# Patient Record
Sex: FEMALE | Race: WHITE
[De-identification: names, ages, dates, MRNs, and addresses within clinical notes are randomized per-mention and may not be internally consistent; named-entity substitution may affect disease eponyms.]

---

## 2018-02-28 ENCOUNTER — HOSPITAL ENCOUNTER (EMERGENCY)
Dept: HOSPITAL 57 - BURERS | Age: 73
Discharge: HOME | End: 2018-02-28
Payer: MEDICARE

## 2018-02-28 DIAGNOSIS — N39.0: ICD-10-CM

## 2018-02-28 DIAGNOSIS — M54.16: Primary | ICD-10-CM

## 2018-02-28 LAB
BACTERIA UR QL AUTO: (no result) HPF
PROT UR STRIP.AUTO-MCNC: 30 MG/DL
RBC UR QL AUTO: (no result) HPF (ref 0–3)
SP GR UR STRIP: 1.01 (ref 1–1.03)
UNIDENT CRYS #/AREA URNS HPF: (no result) HPF
WBC UR QL AUTO: (no result) HPF (ref 0–3)

## 2018-02-28 PROCEDURE — 74176 CT ABD & PELVIS W/O CONTRAST: CPT

## 2018-02-28 PROCEDURE — 81015 MICROSCOPIC EXAM OF URINE: CPT

## 2018-02-28 PROCEDURE — 81003 URINALYSIS AUTO W/O SCOPE: CPT

## 2018-02-28 PROCEDURE — 87086 URINE CULTURE/COLONY COUNT: CPT

## 2018-02-28 NOTE — CT
CT ABDOMEN AND PELVIS WITHOUT CONTRAST

2/28/18

 

Spiral CT of the abdomen and pelvis was done without oral or IV contrast for evaluation of right lowe
r buttocks pain and back pain that radiated into the right lower extremity. Comparison is made with a
 7/23/07 study. 

 

The lung bases are clear. The liver, spleen, pancreas, adrenal glands and abdominal aorta showed no a
cute findings within the limitations of a noncontrast study. 

 

Bilateral nonobstructing renal calculi are present. No renal masses were appreciated. There are some 
rounded structures in the left renal pelvis that are probably parapelvic cysts. No ureteral stones we
re seen. While there are many calcifications near the UVJ of the bladder on each side, none appear to
 be within the ureter, and no ureter is dilated. 

 

The bowel is unremarkable showing no dilation, wall thickening or inflammatory change around it. No f
ree air or free fluid was present. 

 

CT of the pelvis shows no pelvic masses, fluid collections, or inflammatory changes. There is promine
nt concentric bulging of the L5-S1 disc which may even impinge upon the thecal sac a bit and perhaps 
even touch the right S1 root. Additionally, there is moderate central canal stenosis at L4-L5 due to 
facet and ligamentous hypertrophy. There is a mild anterior compression of L2.

 

IMPRESSION:  

1.      Bilateral nonobstructing renal calculi.

2.      Prominent concentrically bulging discs at L5-S1 which might be problematic. Moderate spinal s
tenosis at L4-L5. 

 

POS: HOME

## 2018-10-09 ENCOUNTER — HOSPITAL ENCOUNTER (INPATIENT)
Dept: HOSPITAL 92 - ERS | Age: 73
LOS: 6 days | Discharge: ANOTHER HOSPITAL | DRG: 853 | End: 2018-10-15
Attending: INTERNAL MEDICINE | Admitting: INTERNAL MEDICINE
Payer: MEDICARE

## 2018-10-09 VITALS — BODY MASS INDEX: 23.4 KG/M2

## 2018-10-09 DIAGNOSIS — K64.5: ICD-10-CM

## 2018-10-09 DIAGNOSIS — E87.2: ICD-10-CM

## 2018-10-09 DIAGNOSIS — Z88.5: ICD-10-CM

## 2018-10-09 DIAGNOSIS — N13.6: ICD-10-CM

## 2018-10-09 DIAGNOSIS — E83.42: ICD-10-CM

## 2018-10-09 DIAGNOSIS — G93.41: ICD-10-CM

## 2018-10-09 DIAGNOSIS — E83.39: ICD-10-CM

## 2018-10-09 DIAGNOSIS — D65: ICD-10-CM

## 2018-10-09 DIAGNOSIS — D50.9: ICD-10-CM

## 2018-10-09 DIAGNOSIS — G89.29: ICD-10-CM

## 2018-10-09 DIAGNOSIS — J96.01: ICD-10-CM

## 2018-10-09 DIAGNOSIS — R65.21: ICD-10-CM

## 2018-10-09 DIAGNOSIS — E87.6: ICD-10-CM

## 2018-10-09 DIAGNOSIS — K21.9: ICD-10-CM

## 2018-10-09 DIAGNOSIS — N95.2: ICD-10-CM

## 2018-10-09 DIAGNOSIS — R31.9: ICD-10-CM

## 2018-10-09 DIAGNOSIS — A41.51: Primary | ICD-10-CM

## 2018-10-09 DIAGNOSIS — N18.2: ICD-10-CM

## 2018-10-09 DIAGNOSIS — N17.9: ICD-10-CM

## 2018-10-09 DIAGNOSIS — K62.89: ICD-10-CM

## 2018-10-09 DIAGNOSIS — D49.4: ICD-10-CM

## 2018-10-09 DIAGNOSIS — M54.9: ICD-10-CM

## 2018-10-09 DIAGNOSIS — D69.59: ICD-10-CM

## 2018-10-09 DIAGNOSIS — J96.00: ICD-10-CM

## 2018-10-09 LAB
ANALYZER IN CARDIO: (no result)
ANALYZER IN CARDIO: (no result)
ANION GAP SERPL CALC-SCNC: 13 MMOL/L (ref 10–20)
APTT PPP: 51 SEC (ref 22.9–36.1)
APTT PPP: 62.6 SEC (ref 22.9–36.1)
BASE EXCESS STD BLDA CALC-SCNC: -10.7 MEQ/L
BASE EXCESS STD BLDA CALC-SCNC: -11.3 MEQ/L
BUN SERPL-MCNC: 22 MG/DL (ref 9.8–20.1)
CA-I BLDA-SCNC: 1.04 MMOL/L (ref 1.12–1.3)
CA-I BLDA-SCNC: 1.04 MMOL/L (ref 1.12–1.3)
CALCIUM SERPL-MCNC: 7.2 MG/DL (ref 7.8–10.44)
CHLORIDE SERPL-SCNC: 112 MMOL/L (ref 98–107)
CO2 SERPL-SCNC: 18 MMOL/L (ref 23–31)
CREAT CL PREDICTED SERPL C-G-VRATE: 0 ML/MIN (ref 70–130)
CRP SERPL-MCNC: 12.11 MG/DL
D DIMER PPP FEU-MCNC: (no result) *MCG/ML (ref 0.27–0.43)
DOHLE BOD BLD QL SMEAR: SLIGHT
FIBRINOGEN PPP-MCNC: 148 MG/DL (ref 253–463)
FSP-QUALITATIVE: (no result)
FSP-SEMIQUANTITATIVE: (no result) MCG/ML (ref ?–5)
GLUCOSE SERPL-MCNC: 62 MG/DL (ref 83–110)
HCO3 BLDA-SCNC: 13.2 MEQ/L (ref 22–28)
HCO3 BLDA-SCNC: 14.4 MEQ/L (ref 22–28)
HCT VFR BLDA CALC: 25 % (ref 36–47)
HCT VFR BLDA CALC: 28 % (ref 36–47)
HGB BLD-MCNC: 10 G/DL (ref 12–16)
HGB BLDA-MCNC: 8.4 G/DL (ref 12–16)
HGB BLDA-MCNC: 9.5 G/DL (ref 12–16)
INR PPP: 1.9
INR PPP: 2.2
MAGNESIUM SERPL-MCNC: 1.2 MG/DL (ref 1.6–2.6)
MCH RBC QN AUTO: 30.4 PG (ref 27–31)
MCV RBC AUTO: 98.3 FL (ref 78–98)
MDIFF COMPLETE?: YES
O2 A-A PPRESDIFF RESPIRATORY: 218.43 MM[HG] (ref 0–20)
OVALOCYTES BLD QL SMEAR: (no result) (100X)
PCO2 BLDA: 23.5 MMHG (ref 35–45)
PCO2 BLDA: 31.6 MMHG (ref 35–45)
PH BLDA: 7.28 [PH] (ref 7.35–7.45)
PH BLDA: 7.37 [PH] (ref 7.35–7.45)
PLATELET # BLD AUTO: 44 THOU/UL (ref 130–400)
PLATELET # BLD AUTO: 44 THOU/UL (ref 130–400)
PLATELET BLD QL SMEAR: (no result)
PO2 BLDA: 100.7 MMHG (ref 70–?)
PO2 BLDA: 108.7 MMHG (ref 70–?)
POTASSIUM BLD-SCNC: 2.65 MMOL/L (ref 3.7–5.3)
POTASSIUM BLD-SCNC: 3.1 MMOL/L (ref 3.7–5.3)
POTASSIUM SERPL-SCNC: 3.5 MMOL/L (ref 3.5–5.1)
PROTHROMBIN TIME: 21.6 SEC (ref 12–14.7)
PROTHROMBIN TIME: 24.5 SEC (ref 12–14.7)
RBC # BLD AUTO: 3.28 MILL/UL (ref 4.2–5.4)
SODIUM SERPL-SCNC: 139 MMOL/L (ref 136–145)
SPECIMEN DRAWN FROM PATIENT: (no result)
SPECIMEN DRAWN FROM PATIENT: (no result)
WBC # BLD AUTO: 9.1 THOU/UL (ref 4.8–10.8)

## 2018-10-09 PROCEDURE — 87070 CULTURE OTHR SPECIMN AEROBIC: CPT

## 2018-10-09 PROCEDURE — 85049 AUTOMATED PLATELET COUNT: CPT

## 2018-10-09 PROCEDURE — 94003 VENT MGMT INPAT SUBQ DAY: CPT

## 2018-10-09 PROCEDURE — 36415 COLL VENOUS BLD VENIPUNCTURE: CPT

## 2018-10-09 PROCEDURE — 88121 CYTP URINE 3-5 PROBES CMPTR: CPT

## 2018-10-09 PROCEDURE — 86140 C-REACTIVE PROTEIN: CPT

## 2018-10-09 PROCEDURE — 84100 ASSAY OF PHOSPHORUS: CPT

## 2018-10-09 PROCEDURE — 83690 ASSAY OF LIPASE: CPT

## 2018-10-09 PROCEDURE — 0BH17EZ INSERTION OF ENDOTRACHEAL AIRWAY INTO TRACHEA, VIA NATURAL OR ARTIFICIAL OPENING: ICD-10-PCS | Performed by: INTERNAL MEDICINE

## 2018-10-09 PROCEDURE — 86900 BLOOD TYPING SEROLOGIC ABO: CPT

## 2018-10-09 PROCEDURE — 82805 BLOOD GASES W/O2 SATURATION: CPT

## 2018-10-09 PROCEDURE — 5A1945Z RESPIRATORY VENTILATION, 24-96 CONSECUTIVE HOURS: ICD-10-PCS | Performed by: INTERNAL MEDICINE

## 2018-10-09 PROCEDURE — S0028 INJECTION, FAMOTIDINE, 20 MG: HCPCS

## 2018-10-09 PROCEDURE — 85384 FIBRINOGEN ACTIVITY: CPT

## 2018-10-09 PROCEDURE — 87186 SC STD MICRODIL/AGAR DIL: CPT

## 2018-10-09 PROCEDURE — C1758 CATHETER, URETERAL: HCPCS

## 2018-10-09 PROCEDURE — 85027 COMPLETE CBC AUTOMATED: CPT

## 2018-10-09 PROCEDURE — 83735 ASSAY OF MAGNESIUM: CPT

## 2018-10-09 PROCEDURE — 82533 TOTAL CORTISOL: CPT

## 2018-10-09 PROCEDURE — 85379 FIBRIN DEGRADATION QUANT: CPT

## 2018-10-09 PROCEDURE — 87077 CULTURE AEROBIC IDENTIFY: CPT

## 2018-10-09 PROCEDURE — P9045 ALBUMIN (HUMAN), 5%, 250 ML: HCPCS

## 2018-10-09 PROCEDURE — 80053 COMPREHEN METABOLIC PANEL: CPT

## 2018-10-09 PROCEDURE — 86850 RBC ANTIBODY SCREEN: CPT

## 2018-10-09 PROCEDURE — 36416 COLLJ CAPILLARY BLOOD SPEC: CPT

## 2018-10-09 PROCEDURE — 0T768DZ DILATION OF RIGHT URETER WITH INTRALUMINAL DEVICE, VIA NATURAL OR ARTIFICIAL OPENING ENDOSCOPIC: ICD-10-PCS | Performed by: UROLOGY

## 2018-10-09 PROCEDURE — 85007 BL SMEAR W/DIFF WBC COUNT: CPT

## 2018-10-09 PROCEDURE — 86901 BLOOD TYPING SEROLOGIC RH(D): CPT

## 2018-10-09 PROCEDURE — 83605 ASSAY OF LACTIC ACID: CPT

## 2018-10-09 PROCEDURE — BT1F1ZZ FLUOROSCOPY OF LEFT KIDNEY, URETER AND BLADDER USING LOW OSMOLAR CONTRAST: ICD-10-PCS | Performed by: UROLOGY

## 2018-10-09 PROCEDURE — 85362 FIBRIN DEGRADATION PRODUCTS: CPT

## 2018-10-09 PROCEDURE — P9035 PLATELET PHERES LEUKOREDUCED: HCPCS

## 2018-10-09 PROCEDURE — 96374 THER/PROPH/DIAG INJ IV PUSH: CPT

## 2018-10-09 PROCEDURE — 85610 PROTHROMBIN TIME: CPT

## 2018-10-09 PROCEDURE — 94660 CPAP INITIATION&MGMT: CPT

## 2018-10-09 PROCEDURE — 0T778DZ DILATION OF LEFT URETER WITH INTRALUMINAL DEVICE, VIA NATURAL OR ARTIFICIAL OPENING ENDOSCOPIC: ICD-10-PCS | Performed by: UROLOGY

## 2018-10-09 PROCEDURE — 87205 SMEAR GRAM STAIN: CPT

## 2018-10-09 PROCEDURE — 85730 THROMBOPLASTIN TIME PARTIAL: CPT

## 2018-10-09 PROCEDURE — 74420 UROGRAPHY RTRGR +-KUB: CPT

## 2018-10-09 PROCEDURE — 71045 X-RAY EXAM CHEST 1 VIEW: CPT

## 2018-10-09 PROCEDURE — 3E043XZ INTRODUCTION OF VASOPRESSOR INTO CENTRAL VEIN, PERCUTANEOUS APPROACH: ICD-10-PCS | Performed by: INTERNAL MEDICINE

## 2018-10-09 PROCEDURE — C1769 GUIDE WIRE: HCPCS

## 2018-10-09 PROCEDURE — 36430 TRANSFUSION BLD/BLD COMPNT: CPT

## 2018-10-09 PROCEDURE — 85300 ANTITHROMBIN III ACTIVITY: CPT

## 2018-10-09 PROCEDURE — 94002 VENT MGMT INPAT INIT DAY: CPT

## 2018-10-09 PROCEDURE — 02H633Z INSERTION OF INFUSION DEVICE INTO RIGHT ATRIUM, PERCUTANEOUS APPROACH: ICD-10-PCS | Performed by: INTERNAL MEDICINE

## 2018-10-09 PROCEDURE — P9047 ALBUMIN (HUMAN), 25%, 50ML: HCPCS

## 2018-10-09 PROCEDURE — 94640 AIRWAY INHALATION TREATMENT: CPT

## 2018-10-09 RX ADMIN — DEXTROSE MONOHYDRATE SCH: 25 INJECTION, SOLUTION INTRAVENOUS at 18:22

## 2018-10-09 RX ADMIN — Medication PRN MLS: at 18:49

## 2018-10-09 RX ADMIN — DEXTROSE MONOHYDRATE SCH GM: 25 INJECTION, SOLUTION INTRAVENOUS at 18:38

## 2018-10-09 RX ADMIN — FAMOTIDINE SCH MG: 10 INJECTION, SOLUTION INTRAVENOUS at 20:05

## 2018-10-09 NOTE — HP
DATE OF ADMISSION:  10/09/2018.

 

PRIMARY CARE PHYSICIAN:  Emperatriz Valerio D.O.

 

CHIEF COMPLAINT:  Generalized weakness.

 

HISTORY OF PRESENT ILLNESS:  The patient initially presented to [facility] Emergency Room and was transferred to this facility.

 

HISTORY OF PRESENT ILLNESS:  The patient is a 73-year-old female who presented 
to the emergency room with above complaints.  At this time, patient is 
intubated on mechanical ventilation.  History obtained from the chart.  No 
family at the bedside.  The patient presented to [place] [facility] with 
generalized weakness along with dizziness.  She also had abdominal discomfort 
yesterday with one episode of vomiting.  She has been nauseous over the last 24 
hours.  No diarrhea reported.

 

In the emergency room, initial vital signs showed temperature 99.8, 
respirations 15, pulse rate of 124 with a blood pressure of 77/46 with O2 
saturation of 96% on room air.  She underwent a CT scan stone protocol that 
showed 5 mm stone at UVJ with significant hydronephrosis.  She received Toradol
, Levaquin, ceftriaxone, Zofran and IV fluids and was transferred to this 
facility for hospital admission.

 

The patient underwent ureteral stent placement on an emergent basis.  She is 
currently intubated, on mechanical ventilation.

 

PAST MEDICAL HISTORY:  GERD, chronic back pain, and vertigo.

 

PAST SURGICAL HISTORY:

1.  Cholecystectomy.

2.  Hysterectomy.

3.  Removal of kidney stone.

4.  Bilateral cataract surgery.

 

ALLERGIES:  The patient is allergic to DARVOCET.

 

CURRENT HOME MEDICATIONS:  Per ER record, Norco, iron, Zofran, ibuprofen, 
vitamin D, and Pepcid.

 

SOCIAL HISTORY:  Patient currently lives at home with her family.  No smoking, 
alcohol, or drug use reported.  She makes her own decision with the help of her 
family.  She is FULL CODE.

 

FAMILY HISTORY:  Negative for heart disease.

 

REVIEW OF SYSTEMS:  Cannot be obtained from the patient due to current 
cognitive status.

 

PHYSICAL EXAMINATION:

VITAL SIGNS:  As discussed above.

GENERAL:  A 73-year-old female, intubated and sedated on mechanical ventilation.

HEENT:  Head atraumatic, normocephalic.  Sclerae are anicteric.  Dry mucous 
membrane, no oral lesion.

NECK:  Supple, no JVD, no carotid bruit.

LUNGS:  Showed diminished air entry at bilateral bases.  No wheezing or 
rhonchi.  Lungs were symmetrical.

HEART:  S1, S2 present, tachycardic.  No rubs or gallops appreciated.

ABDOMEN:  Soft.  Bowel sounds present.  No rebound or guarding.

EXTREMITIES:  No edema or calf tenderness.

NEUROLOGY AND PSYCHIATRY:  Could not be done due to current cognitive status.

SKIN:  Warm and dry.

LYMPH NODES:  No palpable lymph nodes in the neck.

PERIPHERAL VASCULAR:  Radial pulses palpable bilaterally.

 

LABORATORY AND X-RAY FINDINGS:  CBC showed WBC of 11 with hemoglobin 10.9, 
platelets of 49.  INR of 2.2 with PT 24.5, PTT 62.6.  ABGs have been ordered 
and pending at this time.  Chemistries showed sodium 142, potassium 3.4, 
chloride 107, bicarbonate of 20, BUN 24, creatinine 1.66, total bilirubin 4.3 
with AST of 332, ALT 80, alkaline phosphatase 353, albumin 2.7.  CRP 12.1.  
Cortisol 29.9, magnesium 1.2, phosphorus 1.9.  Urinalysis showed greater than 
50 wbc's with 3+ bacteria.

 

Chest x-ray by my review in the emergency room was negative for infiltrate.  CT 
scan of the abdomen and pelvis, renal stone protocol showed 5 mm distal left 
ureteral calculus causing severe left hydronephrosis.  There are bilateral 
nonobstructing calculi in the kidneys.  Telemetry monitoring by my review 
showed sinus tachycardia.

 

IMPRESSION:

1.  Severe sepsis with acute organ dysfunction/septic shock secondary to 
complicated urinary tract infection.  The patient underwent ureteral stent 
placement on an emergent basis.

2.  Acute hypoxic respiratory failure on mechanical ventilation.  The patient 
was unable to maintain her O2 saturation during the above procedure and was 
subsequently intubated.

3.  Acute kidney injury on chronic kidney disease stage 2 secondary to #1.

4.  Metabolic acidosis/lactic acidosis.  Lactic acid in the emergency room was 
8.1, repeat was 6.1.

5.  Thrombocytopenia with coagulopathy, probably secondary to sepsis.  
Platelets were normal at 192 last month.

6.  Electrolyte abnormality.  The patient has hypokalemia, hypophosphatemia, 
and hypomagnesemia.

7.  Abnormal liver function tests of unclear etiology.  Probably related to 
sepsis.  Patient has history of cholecystectomy.

8. Suspected Hemorrhoid vs Anal mass (per Urology) - further w/u once patient 
stabilizes.

9.  Deep venous thrombosis prophylaxis with sequential compression devices.  No 
anticoagulants per Urology.

 

PLAN:  The patient will be monitored in the Intensive Care Unit.  We will 
consult Critical Care.  We will continue IV hydration.  We will monitor labs, 
lactic acid and coagulation profile on a daily basis.  Pressors as needed.  
Ventilation sedation protocol.  Daily chest x-rays.  We will get ABG on daily 
basis. 

 

Plan of care was discussed with the patient.  We will discuss the plan of care 
with the family.

 

YEHUDA

## 2018-10-09 NOTE — RAD
AP CHEST:

 

Date:  10/09/18 

 

HISTORY:  

Assess central line placement. 

 

FINDINGS:

An ET tube is in place with tip above brandon. A central line is in place and tip overlies the right a
trium. 

 

The lungs appear well aerated and clear of infiltrate. Vascular markings upper normal. 

 

IMPRESSION: 

No acute lung process. ET tube and central line appear adequately positioned. 

 

 

POS: [facility]

## 2018-10-09 NOTE — HP
DATE OF CONSULTATION:  10/09/2018.

 

ER consultation regarding urosepsis and obstructing ureteral calculi.

 

HISTORY OF PRESENT ILLNESS:  Ms. Molina is a 73-year-old female, transferred 
from [facility] for higher level of care.  She presented with 
uroseptic picture due to obstructing left UVJ stone.  The patient is somewhat 
lethargic, is a poor historian.  Therefore, much of the history is obtained per 
chart.  No family at bedside.  She does relate prior history of kidney stone 
surgery removed, she states that this was years ago somewhere in [facility].  She was found to be hypotensive, tachycardic.  CT demonstrated left 
distal ureteral stone with severe hydronephrosis with bilateral renal calculi.  
There is a right renal pelvic stone as well.  Currently, she is resting 
comfortably; however, appears lethargic.

 

PAST MEDICAL HISTORY:  History of kidney stones, iron deficiency anemia, vertigo
, acid reflux, history of thrombocytopenia.

 

PAST SURGICAL HISTORY:  Cholecystectomy, hysterectomy, prior history of kidney 
stone surgery, nature unknown, bilateral cataract surgery.

 

PSYCHIATRIC:  Negative.

 

SOCIAL HISTORY:  Negative.  Lives with her  who is not at the bedside 
yet.

 

PHYSICAL EXAMINATION:

VITAL SIGNS:  On arrival.  She is 75/50, currently 90/47, heart rate 117.  Pain 
is currently rated 0, 95% on room air.

 

HOME MEDICATIONS:  Include Norco 10/325, iron, Zofran, ibuprofen 800 mg t.i.d., 
vitamin D3, Pepcid.  She has been provided Rocephin and Levaquin in [facility].

 

REVIEW OF SYSTEMS:  Ten-point review of systems as above.

 

PHYSICAL EXAMINATION:

GENERAL:  She is somewhat lethargic, somewhat difficult to arouse; however, 
arousable.  She is a poor historian.

HEENT:  Grossly unremarkable.

HEART:  Tachycardic.

LUNGS:  Decreased inspiratory effort.

ABDOMEN:  No rigidity, no rebound.  Right upper quadrant incision consistent 
with open cholecystectomy.  Midline infraumbilical incision, likely consistent 
with hysterectomy.

GENITOURINARY:  Deferred.

EXTREMITIES:  No cyanosis, clubbing, or edema.

 

PERTINENT LABS:  Sodium 142, potassium 3.4, creatinine 1.6 from baseline of 
0.7.  Lactic acid is 8.1.  White blood cell count is 11, hemoglobin 10, 
platelets 49, her baseline.  Previously, platelet was 192, however, he is known 
to have low platelet previously of 84.  Urinalysis is dark greater than 300 
protein, 11-20 rbc's, greater than 50 wbc's, 4-6 epithelial, 3+ bacteria.

 

CT of the abdomen and pelvis without contrast which I reviewed myself.

1.  Severe left hydronephrosis due to 5 mm left distal ureteral stone at the 
level of ureterovesical junction.  There may be 1 or 2 small calculi just 
proximal to this.  Bilateral nonobstructing renal calculi, left lower pole 
linear calcific density x2.  On the right, there is a punctate mid pole 
calcification, there is a right renal pelvic proximal ureteral stone measuring 
approximately 6-7 mm.

2.  Possible several calcifications along the course of the right ureter; 
however, appears not to be within it.  There are multiple pelvic phleboliths.

 

IMPRESSION/PLAN:  Ms. Molina is a 73-year-old female who is currently 
presenting with uroseptic picture.  She does have definite left hydronephrosis 
with UVJ stone.  The right UPJ proximal ureteral stone is concerning for 
progression and obstruction.  Therefore, I would advise bilateral stent 
placement.  I will proceed with treatment of stone at a later date when she has 
recovered in a few weeks.  She was admitted to Medicine for sepsis.  She is 
n.p.o. for cystoscopy, bilateral stent.  Meropenem on call to the OR.

 

[facility]

## 2018-10-09 NOTE — OP
DATE OF PROCEDURE:  10/09/2018

 

PREOPERATIVE DIAGNOSES:

1.  A 73-year-old female presents with urosepsis, elevated lactic acid.

2.  Acute kidney injury.

3.  Left distal ureteral stone x2 measuring approximately 5-6 mm each, with 
left hydronephrosis.  Left linear calcific density x2 in the lower pole.

4.  Right 6-mm UPJ proximal ureteral stone, right punctate renal calculi.

 

POSTOPERATIVE DIAGNOSES:

1.  A 73-year-old female presents with urosepsis, elevated lactic acid.

2.  Acute kidney injury.

3.  Left distal ureteral stone x2 measuring approximately 5-6 mm each, with 
left hydronephrosis.  Left linear calcific density x2 in the lower pole.

4.  Right 6-mm UPJ proximal ureteral stone, right punctate renal calculi.

 

PROCEDURES PERFORMED:  Cystoscopy, bilateral 6 x 26 double-J ureteral stent 
placement, left retrograde pyelogram.

 

SPECIMENS:

1.  Left ureteral catheterization, urine culture specimen.

2.  Urine FISH cytology barbotaged.

 

DRAINS:  Bilateral 6 x 26 stent 20-French 3-way Jenkins catheter with CBI port 
plugged to gravity.

 

INTRAOPERATIVE FINDINGS:

1.  Urine pungent, consistent with urinary tract infection.

2.  Severe atrophic vaginitis.

3.  Incidental right trigonal bladder mass approximately 1.5 cm with no active 
bleeding ,suspicious for occult transitional cell carcinoma.

4.  Large perianal mass, large thrombosed hemorrhoids, cannot rule out occult 
malignancy.  Recommend elective  General Surgery evaluation.

 

INDICATIONS FOR THE PROCEDURE AND HISTORY:  Ms. Molina is a 73-year-old 
female who presented to [facility].  She was found to have hypotension
, tachycardia.  CT demonstrating left hydronephrosis due to distal ureteral 
calculi, right renal calculi as above.  There is no obvious bladder mass on CT.
  She presented with lactic acid of 8.1 with acute kidney injury of creatinine 
of 1.6.  Her platelet count is low at 49,000.  She has a history of low 
platelet count and previous of 84.  Given the above findings, she presents with 
septic picture advised regarding emergent stent placement.  Risks and 
complications of the procedure was reviewed with them in detail including, but 
not limited to, bleeding, pain, infection, injury to adjacent organs, 
urosepsis.  I did inform regarding right stent concomitantly.  Although the 
right renal pelvic stone is obstructing, concern regarding imminent migration 
and obstruction, worsening her septic picture.  Therefore, family desired to 
proceed with bilateral stent.  Elective treatment of stone at a later date was 
discussed with patient and family in detail.

 

DESCRIPTION OF THE PROCEDURE:  After an informed consent was signed, the 
patient was taken to the operating room, placed in a dorsal lithotomy position 
with the genital area prepped and draped in the usual surgical sterile fashion. 
Upon placing the patient in a dorsal lithotomy position, she has a large 
thrombosed mass of the anal region.  There is a polyp associated.  They 
appeared to be not significantly hard and indurated; however, it is very large.
  She does have significant atrophic vaginitis.  We prepped and draped during 
provided meropenem on call.  A 21-French cystoscope was utilized.  Upon 
entering the bladder, the sponge and smelling urine consistent with cystitis.  
There was mild amount of debris.  At this time, we surveyed the bladder.  After 
we irrigated the bladder, we had better visualization.  She has an incidental 
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 
1.5 cm.  There is no active bleeding appreciated.  I did not want to treat the 
bladder tumor at this time as she presents with hypotension and severe sepsis.  
We passed a left open-ended catheter into the left collecting system.  I was 
able to pass the open-ended catheter to the level of the proximal ureter.  I 
passed a 0.35 sensor wire; however, the curl of the wire was suboptimal 
regarding obvious placement into the collecting system.  Therefore, I performed 
a gentle retrograde to confirm proper placement.  It was indeed in the proper 
placement and a 6 x 26 double-J ureteral stent was passed into the left kidney.
  The right 6 x 26 stent was able to be passed over a guidewire without 
perforating a retrograde.  A 6 x 26 was placed into the right kidney.  At this 
time, we placed a 20-French three-way Jenkins catheter as she does have 
significant amount of hematuria with the stents.  She is not bleeding from a 
bladder tumor as it was not endoscopically bleeding.  I believe that hematuria 
component is from the ureteral stent placement.  She has severe low platelet 
count at 44.  Therefore, I did place a 20-French three-way Jenkins catheter with 
CBI port plugged.  This irrigates.  She has hematuria which will need to be 
observed.  She was transported to the recovery room in a guarded condition.  
Due to respiratory failure, she is intubated.  I did inform the Hospitalist 
regarding holding anticoagulation due to thrombocytopenia and hematuria.  
General Surgery elective evaluation is advised regarding anal mass.

 

YEHUDA

## 2018-10-09 NOTE — RAD
RETROGRADE PYELOGRAM:

10/9/18

 

Two fluoroscopic images presented from OR during retrograde procedure. 

 

INDICATIONS:

Bilateral stent placement and intraoperative imaging.

 

FINDINGS/IMPRESSION:  

First image shows a left ureteral stent in place with opacification of the left upper collecting stru
ctures. 

 

The second image shows bilateral double pigtail ureteral stents. 

 

POS: [facility]

## 2018-10-10 LAB
ALBUMIN SERPL BCG-MCNC: 2.3 G/DL (ref 3.4–4.8)
ALP SERPL-CCNC: 201 U/L (ref 40–150)
ALT SERPL W P-5'-P-CCNC: 41 U/L (ref 8–55)
ANALYZER IN CARDIO: (no result)
ANION GAP SERPL CALC-SCNC: 10 MMOL/L (ref 10–20)
ANION GAP SERPL CALC-SCNC: 10 MMOL/L (ref 10–20)
APTT PPP: 61.1 SEC (ref 22.9–36.1)
AST SERPL-CCNC: 111 U/L (ref 5–34)
BASE EXCESS STD BLDA CALC-SCNC: -8.9 MEQ/L
BILIRUB SERPL-MCNC: 2.4 MG/DL (ref 0.2–1.2)
BUN SERPL-MCNC: 19 MG/DL (ref 9.8–20.1)
BUN SERPL-MCNC: 21 MG/DL (ref 9.8–20.1)
CA-I BLDA-SCNC: 1.13 MMOL/L (ref 1.12–1.3)
CALCIUM SERPL-MCNC: 7.4 MG/DL (ref 7.8–10.44)
CALCIUM SERPL-MCNC: 7.4 MG/DL (ref 7.8–10.44)
CHLORIDE SERPL-SCNC: 114 MMOL/L (ref 98–107)
CHLORIDE SERPL-SCNC: 116 MMOL/L (ref 98–107)
CO2 SERPL-SCNC: 16 MMOL/L (ref 23–31)
CO2 SERPL-SCNC: 16 MMOL/L (ref 23–31)
CREAT CL PREDICTED SERPL C-G-VRATE: 0 ML/MIN (ref 70–130)
CREAT CL PREDICTED SERPL C-G-VRATE: 41 ML/MIN (ref 70–130)
GLOBULIN SER CALC-MCNC: 1.6 G/DL (ref 2.4–3.5)
GLUCOSE SERPL-MCNC: 150 MG/DL (ref 83–110)
GLUCOSE SERPL-MCNC: 167 MG/DL (ref 83–110)
HCO3 BLDA-SCNC: 13.9 MEQ/L (ref 22–28)
HCT VFR BLDA CALC: 29 % (ref 36–47)
HGB BLD-MCNC: 9.3 G/DL (ref 12–16)
HGB BLDA-MCNC: 10 G/DL (ref 12–16)
INR PPP: 1.9
LIPASE SERPL-CCNC: (no result) U/L (ref 8–78)
MAGNESIUM SERPL-MCNC: 2.4 MG/DL (ref 1.6–2.6)
MCH RBC QN AUTO: 30.9 PG (ref 27–31)
MCV RBC AUTO: 94.9 FL (ref 78–98)
MDIFF COMPLETE?: YES
O2 A-A PPRESDIFF RESPIRATORY: 124.62 MM[HG] (ref 0–20)
PCO2 BLDA: 21.5 MMHG (ref 35–45)
PH BLDA: 7.43 [PH] (ref 7.35–7.45)
PLATELET # BLD AUTO: 31 THOU/UL (ref 130–400)
PLATELET BLD QL SMEAR: (no result)
PO2 BLDA: 133.7 MMHG (ref 70–?)
POTASSIUM BLD-SCNC: 2.87 MMOL/L (ref 3.7–5.3)
POTASSIUM SERPL-SCNC: 2.9 MMOL/L (ref 3.5–5.1)
POTASSIUM SERPL-SCNC: 3.1 MMOL/L (ref 3.5–5.1)
POTASSIUM SERPL-SCNC: 3.2 MMOL/L (ref 3.5–5.1)
PROTHROMBIN TIME: 21.6 SEC (ref 12–14.7)
RBC # BLD AUTO: 3 MILL/UL (ref 4.2–5.4)
SODIUM SERPL-SCNC: 137 MMOL/L (ref 136–145)
SODIUM SERPL-SCNC: 139 MMOL/L (ref 136–145)
SPECIMEN DRAWN FROM PATIENT: (no result)
WBC # BLD AUTO: 12.9 THOU/UL (ref 4.8–10.8)

## 2018-10-10 RX ADMIN — FAMOTIDINE SCH MG: 10 INJECTION, SOLUTION INTRAVENOUS at 21:35

## 2018-10-10 RX ADMIN — MEROPENEM AND SODIUM CHLORIDE SCH MLS: 1 INJECTION, SOLUTION INTRAVENOUS at 01:24

## 2018-10-10 RX ADMIN — Medication PRN MLS: at 05:51

## 2018-10-10 RX ADMIN — POTASSIUM CHLORIDE PRN MLS: 29.8 INJECTION, SOLUTION INTRAVENOUS at 17:34

## 2018-10-10 RX ADMIN — Medication PRN MLS: at 17:33

## 2018-10-10 RX ADMIN — MEROPENEM AND SODIUM CHLORIDE SCH MLS: 1 INJECTION, SOLUTION INTRAVENOUS at 15:09

## 2018-10-10 NOTE — PDOC.PN
- Subjective


Encounter Start Date: 10/10/18


Encounter Start Time: 13:30


-: non-verbal





Patient seen and examined for Septic shock. On Vent/pressors. No overnight 

events





- Objective


Resuscitation Status: 


 











Resuscitation Status           FULL:Full Resuscitation














MAR Reviewed: Yes


Vital Signs & Weight: 


 Vital Signs (12 hours)











  Temp Pulse Resp BP


 


 10/10/18 22:12   122 H   90/54 L


 


 10/10/18 18:28   135 H   84/53 L


 


 10/10/18 18:00    21 H 


 


 10/10/18 17:03   131 H   99/58 L


 


 10/10/18 17:00  100.8 F H   


 


 10/10/18 16:00    18 


 


 10/10/18 14:36   130 H   106/65


 


 10/10/18 14:00    16 


 


 10/10/18 12:00    18 








 Weight











Admit Weight                   134 lb 8 oz


 


Weight                         134 lb 8 oz











 Most Recent Monitor Data











Heart Rate from ECG            121


 


NIBP                           95/56


 


NIBP BP-Mean                   66


 


Respiration from ECG           18


 


SpO2                           100














I&O: 


 











 10/09/18 10/10/18 10/11/18





 06:59 06:59 06:59


 


Intake Total  3290.6 3483.6


 


Output Total  1655 2410


 


Balance  1635.6 1073.6











Result Diagrams: 


 10/11/18 05:45





 10/11/18 05:45


Additional Labs: 


 Accuchecks











  10/10/18 10/10/18 10/10/18





  18:18 12:26 04:40


 


POC Glucose  111 H  144 H  154 H














  10/10/18





  00:44


 


POC Glucose  152 H











EKG Reviewed by me: Yes (Tele ST)





Phys Exam





- Physical Examination


On Vent


Neck: no JVD


Respiratory: no wheezing, no rales, no rhonchi


dec AE at bases


Cardiovascular: RRR, no rub


no heaves/pulsations


Gastrointestinal: soft, positive bowel sounds


no guarding/rigidity


Musculoskeletal: no edema


Neuro/Psych - Cannot obtain due to current cognition





Dx/Plan





- Plan


DVT proph w/SCDs





IMPRESSION:


1.  Severe sepsis with acute organ dysfunction/septic shock secondary to 

complicated urinary tract infection.  s/p ureteral stent placement


2.  Acute hypoxic respiratory failure on mechanical ventilation. 


3.  Acute kidney injury on chronic kidney disease stage 2 secondary to #1.


4.  Metabolic acidosis/lactic acidosis.  Lactic acid in the emergency room was 

8.1, repeat was 6.1.


5.  Thrombocytopenia with coagulopathy, probably secondary to sepsis.  

Platelets were normal at 192 last month.


6.  Electrolyte abnormality.  The patient has hypokalemia, hypophosphatemia, 

and hypomagnesemia.


7.  Abnormal liver function tests.


8.  External Hemorrhoid/New Bladder cancer


9.  Deep venous thrombosis prophylaxis with sequential compression devices.  


 


PLAN:


Cont Atbx/pressors/Vent support


Replace electrolytes


AM labs


Cont IVF


Cont other meds as below














Review of Systems





- Review of Systems


Other: 





Cannot obtain due to current cogntion





- Medications/Allergies


Allergies/Adverse Reactions: 


 Allergies











Allergy/AdvReac Type Severity Reaction Status Date / Time


 


No Known Drug Allergies Allergy   Verified 08/27/18 15:57











Medications: 


 Current Medications





Acetaminophen (Tylenol Elixir)  650 mg PO Q6H PRN


   PRN Reason: Fever > 101 or Mild Pain


Acetaminophen (Tylenol)  650 mg VA Q6H PRN


   PRN Reason: Fever > 101 or Mild Pain


Albuterol/Ipratropium (Duoneb)  3 ml NEB [id number] PRN


   PRN Reason: SOB &/or Wheezing


Bisacodyl (Dulcolax)  10 mg VA DAILYPRN PRN


   PRN Reason: Constipation


Famotidine (Pepcid)  20 mg SLOW IVP Q24HR ALEJANDRO


   Last Admin: 10/10/18 21:35 Dose:  20 mg


Norepinephrine Bitartrate (Levophed)  250 mls @ 0 mls/hr IVPB PRN PRN; Protocol


   PRN Reason: To maintain MAP > 65


   Last Admin: 10/10/18 17:33 Dose:  250 mls


Fentanyl Citrate 2,000 mcg/ (Sodium Chloride)  100 mls @ 0 mls/hr IV INF ALEJANDRO; 

Protocol


   Stop: 11/08/18 16:45


Fentanyl Citrate (Fentanyl Bolus)  250 mls @ 0 mls/hr IVPB PRN PRN


   PRN Reason: Breakthrough pain/agitation


   Stop: 11/08/18 16:45


Meropenem 1 gm/ Device  50 mls @ 100 mls/hr IVPB 0200,1400 ALEJANDRO


   Last Admin: 10/10/18 15:09 Dose:  50 mls


Dextrose/Water (Dextrose 10% In Water)  1,000 mls @ 125 mls/hr IV .Q8H ALEJANDRO


   Last Admin: 10/10/18 17:35 Dose:  1,000 mls


Potassium Chloride 40 meq/ (Sodium Chloride)  270 mls @ 135 mls/hr IVPB ASDIR 

PRN


   PRN Reason: FOR SERUM K+ 2.5 - 3.5


Potassium Chloride 40 meq/ (Device)  100 mls @ 50 mls/hr IVPB ASDIR PRN


   PRN Reason: FOR SERUM K+ 2.5 - 3.5


   Last Admin: 10/10/18 17:34 Dose:  100 mls


Magnesium Sulfate 1 gm/ Sodium (Chloride)  102 mls @ 102 mls/hr IV PRN PRN


   PRN Reason: MAG LEVEL 1.4 - 2.0


Magnesium Sulfate 2 gm/ Device  100 mls @ 100 mls/hr IVPB ASDIR PRN


   PRN Reason: MAGNESIUM < 1.4


Potassium Phosphate 9 mmol/ (Sodium Chloride)  103 mls @ 25.75 mls/hr IVPB 

ASDIR PRN


   PRN Reason: Phosphate 1.0-1.8


Potassium Phosphate 12 mmol/ (Sodium Chloride)  254 mls @ 63.5 mls/hr IV ASDIR 

PRN


   PRN Reason: Serum phosphate 0.5-0.9


Potassium Phosphate 15 mmol/ (Sodium Chloride)  255 mls @ 63.75 mls/hr IV ASDIR 

PRN


   PRN Reason: Serum Phos < 0.5


Lorazepam (Ativan)  2 mg SLOW IVP Q1H PRN


   PRN Reason: Breakthrough agitation


   Stop: 11/08/18 16:45


   Last Admin: 10/09/18 22:40 Dose:  2 mg


Magnesium Oxide (Magnesium Oxide)  400 mg PO BIDPRN PRN


   PRN Reason: FOR SERUM MAG 1.4 - 2.0


Magnesium Oxide (Magnesium Oxide)  800 mg PO PRN PRN


   PRN Reason: FOR SERUM MAG < 1.4


Mineral Oil/White Petrolatum (Eucerin Cream)  0 gm TOP BIDPRN PRN


   PRN Reason: Dry Skin


Miscellaneous Medication (Pharmacy To Dose)  1 each IVPB PRN PRN


   PRN Reason: Pharmacy to dose


Miscellaneous Medication (Phos-Nak)  1 pkt PO TIDPRN PRN


   PRN Reason: FOR PHOS LEVEL 1.0 - 1.8


Miscellaneous Medication (Phos-Nak)  2 pkt PO TIDPRN PRN


   PRN Reason: FOR PHOS LEVEL 0.5 - 1.0


Morphine Sulfate (Morphine)  2 mg SLOW IVP Q1H PRN


   PRN Reason: BREAKTHROUGH PAIN/AGITATION


   Stop: 11/08/18 16:45


   Last Admin: 10/10/18 21:35 Dose:  2 mg


Discontinue Previous Narcotic Pain Medications And Benzodiazepines  1 each FS 

.ONE ALEJANDRO


   Stop: 11/08/18 16:45


Ondansetron HCl (Zofran)  4 mg IVP Q6H PRN


   PRN Reason: Nausea/Vomiting


   Last Admin: 10/09/18 22:54 Dose:  4 mg


Ondansetron HCl (Zofran)  4 mg IVP Q4H PRN


   PRN Reason: Nausea/Vomiting


Potassium Chloride (K-Dur)  40 meq PO ASDIR PRN


   PRN Reason: FOR SERUM K+  2.5 - 3.5


Potassium Chloride (Klor-Con)  40 meq PER TUBE ASDIR PRN


   PRN Reason: FOR SERUM K+ 2.5-3.5


   Last Admin: 10/10/18 07:41 Dose:  40 meq


Propofol (Diprivan)  1,000 mg IV INF PRN; Protocol


   PRN Reason: TO ACHIEVE GOAL RASS


   Stop: 11/08/18 16:45


   Last Admin: 10/09/18 22:40 Dose:  1,000 mg


Propofol (Diprivan Bolus)  20 mg IV Q5MIN PRN


   PRN Reason: BREAKTHROUGH AGITATION


   Stop: 11/08/18 16:45


Sodium Chloride (Flush - Normal Saline)  10 ml IVF PRN PRN


   PRN Reason: Saline Flush

## 2018-10-10 NOTE — PRG
DATE OF SERVICE:  10/10/2018

 

SUBJECTIVE:  The patient intubated on pressors, blood pressure 84/56.

 

OBJECTIVE:

VITAL SIGNS:  T-max 100.3, 84/56, 67.  I's and O's 3290 in and 1550 out.  Urine 
output concentrated yellow.

ABDOMEN:  Soft.  No rigidity, no rebound.

GENITOURINARY:  Jenkins catheter in place.  No significant hematuria of concern.

 

PERTINENT LABORATORY DATA:  White count 12, hemoglobin 9.3, platelet count of 31
,000 with 38 bands.  INR is 2.2, PTT of 62.  Admitting creatinine 1.6, today it 
is 1.2, potassium was 2.9.  Liver function panel elevated.  Lactic acid on 
admission 8, repeat 4.9 this morning.

 

IMPRESSION AND PLAN:  A 73-year-old female admitted for;

1.  Gram-negative le and blood culture urosepsis.  Postoperative day #1 status 
post cystoscopy, bilateral stent.

2.  CT demonstrating left hydronephrosis due to 5-6 mm ureterovesical junction 
stone, possibly x2, nonobstructing left renal lithiasis, curvilinear.

3.  Right 6-mm ureteropelvic junction, proximal ureteral stone, right punctate 
renal calculi.  Continue indwelling Jenkins catheter, broad spectrum antibiotic, 
patient remains in critical status.

4.  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 
1.5 cm.  I will perform transurethral resection of bladder tumor later date 
with ureteroscopy, laser lithotripsy.  Continue meropenem and vancomycin.  
Await final sensitivity and ID.

5.  Large perianal mass, likely large thrombosed hemorrhoid, recommend elective 
General Surgery evaluation, cannot rule out occult malignancy, although 
suspicion low.

 

MTDD

## 2018-10-10 NOTE — CON
DATE OF CONSULTATION:  10/10/2018

 

HISTORY OF PRESENT ILLNESS:  Naomy Molina is a 73-year-old female who was admitted for urinary se
psis, undergoing 10/09/2018 operative procedure by Dr. Castro for left distal ureteral stone and
 a right ureteral stone and double-J ureteral stent placement with a left retrograde pyelogram.  The 
patient is septic and has respiratory failure on the ventilator.  She is being weaned now.  She has n
ever had a colonoscopy.  There is no family history of colon cancer.  I have been asked to see her re
garding a suspicious perianal finding.   is at the bedside and although the patient cannot giv
e a history the  does tell me. 

 

ALLERGIES:  None.

 

TOBACCO:  None.

 

ALCOHOL:  None.

 

PAST SURGICAL HISTORY:  Total abdominal hysterectomy, bilateral salpingo-oophorectomy, laparoscopic c
holecystectomy, left ureteral nephrectomy, left flank incision from in the 1970s.

 

PHYSICAL EXAMINATION:

VITAL SIGNS:  Height 5 foot 6, 134 pounds, 21 BMI, 91/50, 131.

LUNGS:  Clear to auscultation.

CARDIAC:  Regular rate and rhythm without murmur or gallop.

ABDOMEN:  Soft, nontender.  Eyes open to voice.

EXTREMITIES:  Unremarkable.  

:  Left flank and incision consistent with prior ureteral stone extraction in the 1970s.  Jenkins cat
heter in place.  

RECTAL:  Perianal area is normal except for large hemorrhoids.  Rectal exam performed was normal with
out masses.  She has stool in the vault.  She has a mild edematous hemorrhoids and hemorrhoidal tags.
  She has some firm hemorrhoid areas that are probably represent past thrombosis.  There are no suspi
cious masses.

 

ASSESSMENT AND PLAN:  Hemorrhoidal disease, but nothing acute that is in need of acute treatment.  Co
ntinue treatment for current medical problems and please call if needed.  The patient is 73-years-old
 and never has had a colonoscopy, might be considered for elective colonoscopy in the future outpatie
nt.

## 2018-10-10 NOTE — PRG
DATE OF SERVICE:  10/10/2018

 

Ms. Molina awakens, she nods to commands.

 

Her sedation was held this morning.  She is very slow to awaken completely.  
She has to be stimulated to awaken her.  She still has septic hemodynamics with 
a resting sinus tachycardia and pressor requirement.  Her Levophed requirements 
have decreased today.

 

Her intake and outputs positive 1635.

 

OBJECTIVE:

LUNGS:  Clear anteriorly.

HEART:  Regular rhythm.

ABDOMEN:  Soft.

EXTREMITIES:  Without asymmetry.  She moves all 4 extremities.

 

LABORATORY DATA:  White count 12.9, hemoglobin 9.3, platelets 31,000.  She 
still has 38% bands on her peripheral smear.  DIC panel was suggestive of some 
degree of DIC.  Blood gas 7.43, CO2 of 21, PO2 133.

 

Sodium 137; potassium 2.9, which has been replaced; chloride 114; bicarbonate 16
; BUN 21; creatinine 1.21.

 

IMPRESSION:

1.  Clinical sepsis secondary to urinary tract infection.  Gram negatives are 
growing from her urine.  Blood cultures from [facility] are positive for E. coli.
  Sensitivities are pending.

2.  Hypotension secondary to urosepsis.

3.  Respiratory failure secondary to urosepsis and metabolic acidosis that is 
improving.

 

Given that E. coli has been isolated, there is no reason to continue the 
vancomycin.

 

We will continue with ventilatory support.  We probably will wean and extubate 
her until first thing in the morning.

 

Critical care time was 30 minutes.

 

[facility]

## 2018-10-10 NOTE — RAD
SEMI UPRIGHT CHEST ONE VIEW:

 

History: 

73-year-old female with history of respiratory insufficiency. 

 

Comparison: 

10-9-18

 

FINDINGS: 

There is considerable rotation to the left. Left support tubes in place. Bilateral vascular congestio
n with some blunting in the costophrenic angles bilaterally, evidence for some pleural effusion which
 may be slightly worse than on the prior study. 

 

IMPRESSION: 

Considerable rotation to the left. Progressive vascular congestion and probable pleural effusions. Co
ntinues short term follow up for clearing or stability. 

 

POS: [facility]

## 2018-10-11 LAB
ALBUMIN SERPL BCG-MCNC: 1.9 G/DL (ref 3.4–4.8)
ALP SERPL-CCNC: 198 U/L (ref 40–150)
ALT SERPL W P-5'-P-CCNC: 29 U/L (ref 8–55)
ANALYZER IN CARDIO: (no result)
ANION GAP SERPL CALC-SCNC: 8 MMOL/L (ref 10–20)
APTT PPP: 60.6 SEC (ref 22.9–36.1)
AST SERPL-CCNC: 44 U/L (ref 5–34)
BASE EXCESS STD BLDA CALC-SCNC: -9.5 MEQ/L
BILIRUB SERPL-MCNC: 1.6 MG/DL (ref 0.2–1.2)
BUN SERPL-MCNC: 19 MG/DL (ref 9.8–20.1)
BURR CELLS BLD QL SMEAR: (no result) (100X)
CA-I BLDA-SCNC: 1.17 MMOL/L (ref 1.12–1.3)
CALCIUM SERPL-MCNC: 7.4 MG/DL (ref 7.8–10.44)
CHLORIDE SERPL-SCNC: 115 MMOL/L (ref 98–107)
CO2 SERPL-SCNC: 17 MMOL/L (ref 23–31)
CREAT CL PREDICTED SERPL C-G-VRATE: 46 ML/MIN (ref 70–130)
DOHLE BOD BLD QL SMEAR: SLIGHT
GLOBULIN SER CALC-MCNC: 1.5 G/DL (ref 2.4–3.5)
GLUCOSE SERPL-MCNC: 146 MG/DL (ref 83–110)
HCO3 BLDA-SCNC: 15.1 MEQ/L (ref 22–28)
HCT VFR BLDA CALC: 26 % (ref 36–47)
HGB BLD-MCNC: 8.7 G/DL (ref 12–16)
HGB BLDA-MCNC: 9 G/DL (ref 12–16)
INR PPP: 1.4
MAGNESIUM SERPL-MCNC: 1.9 MG/DL (ref 1.6–2.6)
MCH RBC QN AUTO: 29.6 PG (ref 27–31)
MCV RBC AUTO: 94.5 FL (ref 78–98)
MDIFF COMPLETE?: YES
O2 A-A PPRESDIFF RESPIRATORY: 120.58 MM[HG] (ref 0–20)
PCO2 BLDA: 28.1 MMHG (ref 35–45)
PH BLDA: 7.35 [PH] (ref 7.35–7.45)
PLATELET # BLD AUTO: 17 THOU/UL (ref 130–400)
PLATELET BLD QL SMEAR: (no result)
PO2 BLDA: 129.5 MMHG (ref 70–?)
POTASSIUM BLD-SCNC: 3.39 MMOL/L (ref 3.7–5.3)
POTASSIUM SERPL-SCNC: 3.4 MMOL/L (ref 3.5–5.1)
PROTHROMBIN TIME: 17.3 SEC (ref 12–14.7)
RBC # BLD AUTO: 2.95 MILL/UL (ref 4.2–5.4)
SODIUM SERPL-SCNC: 137 MMOL/L (ref 136–145)
SPECIMEN DRAWN FROM PATIENT: (no result)
WBC # BLD AUTO: 19.7 THOU/UL (ref 4.8–10.8)

## 2018-10-11 RX ADMIN — ALBUMIN HUMAN SCH GM: 250 SOLUTION INTRAVENOUS at 17:16

## 2018-10-11 RX ADMIN — Medication PRN MLS: at 03:30

## 2018-10-11 RX ADMIN — DEXTROSE AND SODIUM CHLORIDE SCH MLS: 5; 200 INJECTION, SOLUTION INTRAVENOUS at 13:15

## 2018-10-11 RX ADMIN — MEROPENEM AND SODIUM CHLORIDE SCH MLS: 1 INJECTION, SOLUTION INTRAVENOUS at 15:30

## 2018-10-11 RX ADMIN — FAMOTIDINE SCH MG: 10 INJECTION, SOLUTION INTRAVENOUS at 21:57

## 2018-10-11 RX ADMIN — POTASSIUM CHLORIDE PRN MLS: 29.8 INJECTION, SOLUTION INTRAVENOUS at 07:41

## 2018-10-11 RX ADMIN — MEROPENEM AND SODIUM CHLORIDE SCH MLS: 1 INJECTION, SOLUTION INTRAVENOUS at 02:30

## 2018-10-11 NOTE — PRG
DATE OF SERVICE:  10/11/2018

 

SUBJECTIVE:  The patient remains intubated on pressors, remains in critical condition.

 

PHYSICAL EXAMINATION: 

VITAL SIGNS:  T-max 100.8, currently 99.7, heart rate 116, blood pressure 103/59.  I's and O's 5724 i
n, urine output 3825.  Gastric drainage 250.

ABDOMEN:  Soft.  No rigidity, no rebound.

GENITOURINARY:  Jenkins catheter draining yellow urine despite severe thrombocytopenia.

 

LABORATORY DATA:  White blood cell count 19, hemoglobin 8.7, admitting, hemoglobin is 10, platelet on
 admission 44, currently 17, 46 bands.  INR is 1.4, PTT of 60.6.  Renal function has improved from 1.
2 to 1.0.  Elevated liver function tests, lactic acid, decreased from 8-3.4.  Blood culture, urine cu
lture demonstrating E. coli, sensitivity pending.

 

She is currently on meropenem and vancomycin.

 

IMPRESSION AND PLAN:  

1.  A 73-year-old female with E. coli urosepsis.

2.  History of left distal ureteral stone x2 with hydronephrosis.

3.  Right renal pelvic proximal ureteral stone, postoperative day #2 status post cystoscopy, bilatera
l stent.

4.  Cystoscopy demonstrating incidental right trigonal bladder mass, 1.5 cm.  

 

FISH cytology ordered, results pending.  

 

RECOMMENDATIONS:  Despite her coagulopathy from severe sepsis she has not had significant hematuria. 
 Plan per nursing staff Dr. Dale is considering extubation.  If she continues to have high grade fev
er and persistent leukocytosis, would advise regarding restaging CT.  Condition remains guarded.

## 2018-10-11 NOTE — RAD
PORTABLE CHEST:

 

Date: 10/11/18 

 

HISTORY:  

Respiratory distress. 

 

COMPARISON:  

Prior day's study. 

 

FINDINGS:

Endotracheal tube and NG tubes are in satisfactory position. Right subclavian line is unchanged in po
sition. Lungs remain clear of any infiltrates. Left ureteral stent is noted. 

 

IMPRESSION: 

Stable exam. 

 

 

POS: [facility]

## 2018-10-11 NOTE — PDOC.PN
- Subjective


Encounter Start Date: 10/11/18


Encounter Start Time: 09:45


-: non-verbal





Patient seen and examined for Septic shock. On [facility] Vent/pressors. No overnight 

events





- Objective


Resuscitation Status: 


 











Resuscitation Status           FULL:Full Resuscitation














MAR Reviewed: Yes


Vital Signs & Weight: 


 Vital Signs (12 hours)











  Temp Pulse Resp BP Pulse Ox


 


 10/11/18 16:00  98.0 F    


 


 10/11/18 11:45      100


 


 10/11/18 11:00  99.1 F    


 


 10/11/18 10:54   119 H   103/58 L 


 


 10/11/18 10:00    15  


 


 10/11/18 08:00    12   100


 


 10/11/18 07:00  99.0 F    


 


 10/11/18 06:43   116 H   103/59 L 


 


 10/11/18 06:00    13  








 Weight











Admit Weight                   134 lb 8 oz


 


Weight                         145 lb 11.609 oz











 Most Recent Monitor Data











Heart Rate from ECG            113


 


NIBP                           89/63


 


NIBP BP-Mean                   75


 


Respiration from ECG           29


 


SpO2                           100














I&O: 


 











 10/10/18 10/11/18 10/12/18





 06:59 06:59 06:59


 


Intake Total 3290.6 5724.6 617


 


Output Total 1655 3875 740


 


Balance 1635.6 1849.6 -123











Result Diagrams: 


 10/11/18 05:45





 10/11/18 05:45


Additional Labs: 


 Accuchecks











  10/11/18 10/11/18 10/11/18





  16:06 14:30 13:41


 


POC Glucose  86  126 H  60 L














  10/11/18 10/11/18 10/11/18





  13:15 05:48 00:32


 


POC Glucose  63 L  133 H  110














  10/10/18 10/09/18





  18:18 17:45


 


POC Glucose  111 H  Less than  35 L*











EKG Reviewed by me: Yes ([facility])





Phys Exam





- Physical Examination


On Vent


Respiratory: no wheezing, no rhonchi


Cardiovascular: RRR, no rub


Gastrointestinal: soft, positive bowel sounds


Musculoskeletal: no edema





Dx/Plan





- Plan


DVT proph w/SCDs





IMPRESSION:


1.  Severe sepsis with acute organ dysfunction/septic shock secondary to 

complicated urinary tract infection.  s/p ureteral stent placement


2.  Acute hypoxic respiratory failure on mechanical ventilation. 


3.  Acute kidney injury on chronic kidney disease stage 2 secondary to #1.


4.  Metabolic acidosis/lactic acidosis.  


5.  Thrombocytopenia with coagulopathy, probably secondary to sepsis.  


6.  Electrolyte abnormality - hypokalemia, hypophosphatemia, and hypomagnesemia.


7.  Abnormal liver function tests.


8.  External Hemorrhoid/New Bladder cancer


9.  Deep venous thrombosis prophylaxis with sequential compression devices.  


 


PLAN:


Cont Atbx/pressors/Vent support


Cont IVF


Cont other meds as below


AM labs


 Laboratory Tests











  10/11/18 10/11/18 10/11/18





  05:45 05:45 05:45


 


Band Neuts % (Manual)   46 H 


 


INR  1.4  


 


APTT  60.6 H  


 


Potassium    3.4 L


 


Lactic Acid   


 


Total Bilirubin    1.6 H


 


AST    44 H


 


Alkaline Phosphatase    198 H














  10/11/18





  13:13


 


Band Neuts % (Manual) 


 


INR 


 


APTT 


 


Potassium 


 


Lactic Acid  2.6 H


 


Total Bilirubin 


 


AST 


 


Alkaline Phosphatase 

















Review of Systems





- Review of Systems


Other: 





Cannot obtain due to current mentation





- Medications/Allergies


Allergies/Adverse Reactions: 


 Allergies











Allergy/AdvReac Type Severity Reaction Status Date / Time


 


No Known Drug Allergies Allergy   Verified 08/27/18 15:57











Medications: 


 Current Medications





Acetaminophen (Tylenol Elixir)  650 mg PO Q6H PRN


   PRN Reason: Fever > 101 or Mild Pain


Acetaminophen (Tylenol)  650 mg SD Q6H PRN


   PRN Reason: Fever > 101 or Mild Pain


   Last Admin: 10/11/18 15:48 Dose:  650 mg


Albumin Human (Albumin 25%)  25 gm IVPB Q6HR ALEJANDRO


   Stop: 10/13/18 18:01


Albuterol/Ipratropium (Duoneb)  3 ml NEB [id number] PRN


   PRN Reason: SOB &/or Wheezing


Bisacodyl (Dulcolax)  10 mg SD DAILYPRN PRN


   PRN Reason: Constipation


Famotidine (Pepcid)  20 mg SLOW IVP Q24HR ALEJANDRO


   Last Admin: 10/10/18 21:35 Dose:  20 mg


Norepinephrine Bitartrate (Levophed)  250 mls @ 0 mls/hr IVPB PRN PRN; Protocol


   PRN Reason: To maintain MAP > 65


   Last Admin: 10/11/18 03:30 Dose:  250 mls


Meropenem 1 gm/ Device  50 mls @ 100 mls/hr IVPB 0200,1400 ALEJANDRO


   Last Admin: 10/11/18 15:30 Dose:  50 mls


Potassium Chloride 40 meq/ (Sodium Chloride)  270 mls @ 135 mls/hr IVPB ASDIR 

PRN


   PRN Reason: FOR SERUM K+ 2.5 - 3.5


Potassium Chloride 40 meq/ (Device)  100 mls @ 50 mls/hr IVPB ASDIR PRN


   PRN Reason: FOR SERUM K+ 2.5 - 3.5


   Last Admin: 10/11/18 07:41 Dose:  100 mls


Magnesium Sulfate 1 gm/ Sodium (Chloride)  102 mls @ 102 mls/hr IV PRN PRN


   PRN Reason: MAG LEVEL 1.4 - 2.0


Magnesium Sulfate 2 gm/ Device  100 mls @ 100 mls/hr IVPB ASDIR PRN


   PRN Reason: MAGNESIUM < 1.4


Potassium Phosphate 9 mmol/ (Sodium Chloride)  103 mls @ 25.75 mls/hr IVPB 

ASDIR PRN


   PRN Reason: Phosphate 1.0-1.8


Potassium Phosphate 12 mmol/ (Sodium Chloride)  254 mls @ 63.5 mls/hr IV ASDIR 

PRN


   PRN Reason: Serum phosphate 0.5-0.9


Potassium Phosphate 15 mmol/ (Sodium Chloride)  255 mls @ 63.75 mls/hr IV ASDIR 

PRN


   PRN Reason: Serum Phos < 0.5


Dextrose/Sodium Chloride (D5 1/4 Ns)  1,000 mls @ 75 mls/hr IV .[id number] ALEJANDRO


   Last Admin: 10/11/18 13:15 Dose:  1,000 mls


Magnesium Oxide (Magnesium Oxide)  400 mg PO BIDPRN PRN


   PRN Reason: FOR SERUM MAG 1.4 - 2.0


Magnesium Oxide (Magnesium Oxide)  800 mg PO PRN PRN


   PRN Reason: FOR SERUM MAG < 1.4


Mineral Oil/White Petrolatum (Eucerin Cream)  0 gm TOP BIDPRN PRN


   PRN Reason: Dry Skin


Miscellaneous Medication (Pharmacy To Dose)  1 each IVPB PRN PRN


   PRN Reason: Pharmacy to dose


Miscellaneous Medication (Phos-Nak)  1 pkt PO TIDPRN PRN


   PRN Reason: FOR PHOS LEVEL 1.0 - 1.8


Miscellaneous Medication (Phos-Nak)  2 pkt PO TIDPRN PRN


   PRN Reason: FOR PHOS LEVEL 0.5 - 1.0


Discontinue Previous Narcotic Pain Medications And Benzodiazepines  1 each FS 

.ONE [facility]


   Stop: 11/08/18 16:45


Ondansetron HCl (Zofran)  4 mg IVP Q6H PRN


   PRN Reason: Nausea/Vomiting


   Last Admin: 10/09/18 22:54 Dose:  4 mg


Ondansetron HCl (Zofran)  4 mg IVP Q4H PRN


   PRN Reason: Nausea/Vomiting


Potassium Chloride (K-Dur)  40 meq PO ASDIR PRN


   PRN Reason: FOR SERUM K+  2.5 - 3.5


Potassium Chloride (Klor-Con)  40 meq PER TUBE ASDIR PRN


   PRN Reason: FOR SERUM K+ 2.5-3.5


   Last Admin: 10/10/18 07:41 Dose:  40 meq


Sodium Chloride (Flush - Normal Saline)  10 ml IVF PRN PRN


   PRN Reason: Saline Flush

## 2018-10-11 NOTE — PRG
DATE OF SERVICE:  10/11/2018

 

SUBJECTIVE:  Ms. Molina was awakened this morning.  She received a small dose of morphine early and
 it took for an hour to wake up and therefore I felt comfortable extubating her.  She is still on a l
ow dose of Levophed.

 

PHYSICAL EXAMINATION:

VITAL SIGNS:  She is afebrile, heart rates 115 at 1450, she is 98/43, respiratory rate is in the 20s.


LUNGS:  Clear.

HEART:  Regular rhythm.

ABDOMEN:  Soft.

EXTREMITIES:  Without asymmetry.

 

LABORATORY DATA:  White count 19.7, hemoglobin 8.7, platelets 17,000.  Sodium 137, potassium 3.4, chl
oride 115, bicarbonate 17, BUN 19 and creatinine 1.08.

 

IMPRESSION:

1.  Urinary tract sepsis with E. coli growing from urine and blood.  This is a pansensitive organism 
fortunately.

2.  Persistent hypotension.  There is no lab evidence of adrenal insufficiency.

3.  Disseminated intravascular coagulation negative.

4.  Weakness and deconditioning.

5.  Sensitivity to opiates, all opiates will be discontinued.

6.  Respiratory failure secondary to her need for an invasive procedure in the face of a metabolic ac
idosis.  Her acid base disorder is now converted to more of a hyperchloremic acidosis.  I felt she wa
s a candidate for extubation.  This has been done successfully.  She will remain in the Critical Care
 Unit.  Opiate should be avoided even in tiny doses using Tylenol and anti-inflammatories would be be
st for pain control if she has any pain.

 

Critical care time was 30 minutes.

## 2018-10-12 LAB
ALBUMIN SERPL BCG-MCNC: 3 G/DL (ref 3.4–4.8)
ALP SERPL-CCNC: 193 U/L (ref 40–150)
ALT SERPL W P-5'-P-CCNC: 26 U/L (ref 8–55)
ANION GAP SERPL CALC-SCNC: 12 MMOL/L (ref 10–20)
AST SERPL-CCNC: 50 U/L (ref 5–34)
BILIRUB SERPL-MCNC: 3.5 MG/DL (ref 0.2–1.2)
BUN SERPL-MCNC: 17 MG/DL (ref 9.8–20.1)
CALCIUM SERPL-MCNC: 8.5 MG/DL (ref 7.8–10.44)
CHLORIDE SERPL-SCNC: 116 MMOL/L (ref 98–107)
CO2 SERPL-SCNC: 16 MMOL/L (ref 23–31)
CREAT CL PREDICTED SERPL C-G-VRATE: 63 ML/MIN (ref 70–130)
GLOBULIN SER CALC-MCNC: 1.3 G/DL (ref 2.4–3.5)
GLUCOSE SERPL-MCNC: 78 MG/DL (ref 83–110)
HGB BLD-MCNC: 7.6 G/DL (ref 12–16)
INR PPP: 1.4
MAGNESIUM SERPL-MCNC: 1.8 MG/DL (ref 1.6–2.6)
MCH RBC QN AUTO: 30.4 PG (ref 27–31)
MCV RBC AUTO: 95.1 FL (ref 78–98)
MDIFF COMPLETE?: YES
PLATELET # BLD AUTO: 7 THOU/UL (ref 130–400)
PLATELET BLD QL SMEAR: (no result)
POTASSIUM SERPL-SCNC: 4.3 MMOL/L (ref 3.5–5.1)
PROTHROMBIN TIME: 16.9 SEC (ref 12–14.7)
RBC # BLD AUTO: 2.51 MILL/UL (ref 4.2–5.4)
SODIUM SERPL-SCNC: 140 MMOL/L (ref 136–145)
WBC # BLD AUTO: 12.2 THOU/UL (ref 4.8–10.8)

## 2018-10-12 PROCEDURE — 30233R1 TRANSFUSION OF NONAUTOLOGOUS PLATELETS INTO PERIPHERAL VEIN, PERCUTANEOUS APPROACH: ICD-10-PCS | Performed by: INTERNAL MEDICINE

## 2018-10-12 RX ADMIN — ALBUMIN HUMAN SCH: 250 SOLUTION INTRAVENOUS at 19:51

## 2018-10-12 RX ADMIN — ALBUMIN HUMAN SCH GM: 250 SOLUTION INTRAVENOUS at 12:13

## 2018-10-12 RX ADMIN — MEROPENEM AND SODIUM CHLORIDE SCH MLS: 1 INJECTION, SOLUTION INTRAVENOUS at 15:09

## 2018-10-12 RX ADMIN — FAMOTIDINE SCH MG: 10 INJECTION, SOLUTION INTRAVENOUS at 08:53

## 2018-10-12 RX ADMIN — DEXTROSE AND SODIUM CHLORIDE SCH MLS: 5; 200 INJECTION, SOLUTION INTRAVENOUS at 13:24

## 2018-10-12 RX ADMIN — MEROPENEM AND SODIUM CHLORIDE SCH: 1 INJECTION, SOLUTION INTRAVENOUS at 19:51

## 2018-10-12 RX ADMIN — ALBUMIN HUMAN SCH GM: 250 SOLUTION INTRAVENOUS at 17:40

## 2018-10-12 RX ADMIN — ALBUMIN HUMAN SCH GM: 250 SOLUTION INTRAVENOUS at 00:29

## 2018-10-12 RX ADMIN — DEXTROSE AND SODIUM CHLORIDE SCH: 5; 200 INJECTION, SOLUTION INTRAVENOUS at 19:51

## 2018-10-12 NOTE — PRG
DATE OF SERVICE:  10/12/2018

 

SUBJECTIVE:  The patient has been extubated, remains confused, arousable.

 

PHYSICAL EXAMINATION:

VITAL SIGNS:  She has been afebrile for 24 hours, currently is 98.3.  

 

No recent CBC.  Creatinine yesterday was 1.0.

 

ABDOMEN:  Soft.  No rigidity, no rebound.

GENITOURINARY:  Jenkins catheter demonstrating yellow urine.

 

I's and O's, urine output 1 liter over 24 hours.  Blood culture, urine culture 
demonstrating E. coli, pansensitive.   currently on meropenem and vancomycin.

 

IMPRESSION AND PLAN:

1.  Ms. Molina is a 73-year-old female remains in critical status due to 
Escherichia coli urosepsis/DIC

2.  History of left distal ureteral stone x2 with hydronephrosis.

3.  Right renal pelvic proximal ureteral stone.

4.  Status post cystoscopy, bilateral stent.

5.  Cystoscopy demonstrating incidental trigonal bladder mass, 1.5 cm.  FISH 
cytology pending.

 

RECOMMENDATIONS:  The patient remains in critical status and will be observed 
most likely over the weekend in the Intensive Care Unit. with DIC 
thrombocytopenia, coagulopathy however no active bleeding is appreciated.  Plan 
elective bilateral ureteroscopy, TURBT after the patient recovers from current 
Escherichia coli urosepsis.  Will likely require Infectious Disease consult for 
IV PICC line due to presenting urosepsis with positive blood culture.  If 
recurrence of fever or persistent leukocytosis, restaging CT would be 
warranted.  Dr. Hargrove is  covering me this weekend.

 

YEHUDA

## 2018-10-12 NOTE — PRG
DATE OF SERVICE:  10/12/2018

 

Naomy Molina is still encephalopathic.  She is very weak.  She will not follow commands to cough.  S
he has some rattling in her throat from secretions.  She is not in respiratory distress.  

 

PHYSICAL EXAMINATION: 

LUNGS:  Her lungs are clear other than the transmitted noise from her throat.

HEART:  Regular rhythm.

ABDOMEN:  Soft and nontender.

EXTREMITIES:  Without asymmetry or edema.  She moves all 4 extremities. 

 

IMPRESSION:

1.  Encephalopathy secondary to sepsis.  

2.  Sepsis secondary to pyelonephritis secondary to an obstructing nephrolith.

3.  Status post stenting.

4.  DIC.

5.  Deconditioning and weakness.

 

PLAN:  Remain in the Critical Care Unit.  She is not to move out.  She is at high risk for needing re
intubation the next few days unless her encephalopathy improves and she is able to cough.  She does n
ot need BiPAP or intubation at this time.

## 2018-10-12 NOTE — PDOC.PN
- Subjective


Encounter Start Date: 10/12/18


Encounter Start Time: 11:45





Patient seen and examined for Sepsis. Extubated. Somnolent. No overnight events





- Objective


Resuscitation Status: 


 











Resuscitation Status           FULL:Full Resuscitation














MAR Reviewed: Yes


Vital Signs & Weight: 


 Vital Signs (12 hours)











  Temp Pulse Pulse Ox


 


 10/12/18 20:00  98.3 F   98


 


 10/12/18 19:06   104 H 


 


 10/12/18 16:00  98.4 F   99


 


 10/12/18 15:20   110 H 


 


 10/12/18 12:30   108 H 


 


 10/12/18 12:00    89 L








 Weight











Admit Weight                   134 lb 8 oz


 


Weight                         145 lb 11.609 oz











 Most Recent Monitor Data











Heart Rate from ECG            107


 


NIBP                           111/77


 


NIBP BP-Mean                   97


 


Respiration from ECG           37


 


SpO2                           98














I&O: 


 











 10/11/18 10/12/18 10/13/18





 06:59 06:59 06:59


 


Intake Total 5724.6 1373 1335


 


Output Total 3875 1385 1840


 


Balance 1849.6 -12 -505











Result Diagrams: 


 10/13/18 04:10





 10/13/18 04:10


Additional Labs: 


 Accuchecks











  10/12/18 10/12/18





  17:37 12:44


 


POC Glucose  102  85











EKG Reviewed by me: Yes (Tele ST)





Phys Exam





- Physical Examination


Constitutional: NAD


follow commands intermittenly


Respiratory: no wheezing, no rales


Coarse BS B/L


Cardiovascular: RRR, no rub


Gastrointestinal: soft, positive bowel sounds


Musculoskeletal: no edema


Neurological: moves all 4 limbs





Dx/Plan





- Plan


DVT proph w/SCDs





IMPRESSION:


1.  Severe sepsis with acute organ dysfunction/septic shock secondary to 

complicated urinary tract infection.  s/p ureteral stent placement


2.  Acute hypoxic respiratory failure - extubated 10/12


3.  Acute kidney injury on chronic kidney disease stage 2 secondary to #1.


4.  Metabolic acidosis/lactic acidosis.  


5.  Thrombocytopenia with coagulopathy, probably secondary to sepsis.  


6.  Electrolyte abnormality - hypokalemia, hypophosphatemia, and hypomagnesemia.


7.  Abnormal liver function tests.


8.  External Hemorrhoid/New Bladder cancer


9.  Deep venous thrombosis prophylaxis with sequential compression devices.  


 


PLAN:


Transfuse Platelets


Cont Atbx/pressors


Cont current IVF


Cont other meds as below


AM labs








Review of Systems





- Review of Systems


Other: 





Cannot obtain due to current mentation.





- Medications/Allergies


Allergies/Adverse Reactions: 


 Allergies











Allergy/AdvReac Type Severity Reaction Status Date / Time


 


No Known Drug Allergies Allergy   Verified 08/27/18 15:57











Medications: 


 Current Medications





Acetaminophen (Tylenol Elixir)  650 mg PO Q6H PRN


   PRN Reason: Fever > 101 or Mild Pain


Acetaminophen (Tylenol)  650 mg KS Q6H PRN


   PRN Reason: Fever > 101 or Mild Pain


   Last Admin: 10/12/18 13:24 Dose:  650 mg


Albumin Human (Albumin 25%)  25 gm IVPB Q6HR ALEJANDRO


   Stop: 10/13/18 18:01


   Last Admin: 10/12/18 19:51 Dose:  Not Given


Albuterol/Ipratropium (Duoneb)  3 ml NEB [id number] PRN


   PRN Reason: SOB &/or Wheezing


Bisacodyl (Dulcolax)  10 mg KS DAILYPRN PRN


   PRN Reason: Constipation


Famotidine (Pepcid)  20 mg SLOW IVP Q24HR ALEJANDRO


   Last Admin: 10/12/18 08:53 Dose:  20 mg


Norepinephrine Bitartrate (Levophed)  250 mls @ 0 mls/hr IVPB PRN PRN; Protocol


   PRN Reason: To maintain MAP > 65


   Last Admin: 10/11/18 03:30 Dose:  250 mls


Meropenem 1 gm/ Device  50 mls @ 100 mls/hr IVPB 0200,1400 ALEJANDRO


   Last Admin: 10/12/18 19:51 Dose:  Not Given


Potassium Chloride 40 meq/ (Sodium Chloride)  270 mls @ 135 mls/hr IVPB ASDIR 

PRN


   PRN Reason: FOR SERUM K+ 2.5 - 3.5


Potassium Chloride 40 meq/ (Device)  100 mls @ 50 mls/hr IVPB ASDIR PRN


   PRN Reason: FOR SERUM K+ 2.5 - 3.5


   Last Admin: 10/11/18 07:41 Dose:  100 mls


Magnesium Sulfate 1 gm/ Sodium (Chloride)  102 mls @ 102 mls/hr IV PRN PRN


   PRN Reason: MAG LEVEL 1.4 - 2.0


Magnesium Sulfate 2 gm/ Device  100 mls @ 100 mls/hr IVPB ASDIR PRN


   PRN Reason: MAGNESIUM < 1.4


Potassium Phosphate 9 mmol/ (Sodium Chloride)  103 mls @ 25.75 mls/hr IVPB 

ASDIR PRN


   PRN Reason: Phosphate 1.0-1.8


Potassium Phosphate 12 mmol/ (Sodium Chloride)  254 mls @ 63.5 mls/hr IV ASDIR 

PRN


   PRN Reason: Serum phosphate 0.5-0.9


Potassium Phosphate 15 mmol/ (Sodium Chloride)  255 mls @ 63.75 mls/hr IV ASDIR 

PRN


   PRN Reason: Serum Phos < 0.5


Dextrose/Sodium Chloride (D5 1/4 Ns)  1,000 mls @ 75 mls/hr IV .[id number] [facility]


   Last Admin: 10/12/18 19:51 Dose:  Not Given


Magnesium Oxide (Magnesium Oxide)  400 mg PO BIDPRN PRN


   PRN Reason: FOR SERUM MAG 1.4 - 2.0


Magnesium Oxide (Magnesium Oxide)  800 mg PO PRN PRN


   PRN Reason: FOR SERUM MAG < 1.4


Mineral Oil/White Petrolatum (Eucerin Cream)  0 gm TOP BIDPRN PRN


   PRN Reason: Dry Skin


Miscellaneous Medication (Pharmacy To Dose)  1 each IVPB PRN PRN


   PRN Reason: Pharmacy to dose


Miscellaneous Medication (Phos-Nak)  1 pkt PO TIDPRN PRN


   PRN Reason: FOR PHOS LEVEL 1.0 - 1.8


Miscellaneous Medication (Phos-Nak)  2 pkt PO TIDPRN PRN


   PRN Reason: FOR PHOS LEVEL 0.5 - 1.0


Discontinue Previous Narcotic Pain Medications And Benzodiazepines  1 each FS 

.ONE [facility]


   Stop: 11/08/18 16:45


Ondansetron HCl (Zofran)  4 mg IVP Q6H PRN


   PRN Reason: Nausea/Vomiting


   Last Admin: 10/09/18 22:54 Dose:  4 mg


Ondansetron HCl (Zofran)  4 mg IVP Q4H PRN


   PRN Reason: Nausea/Vomiting


Potassium Chloride (K-Dur)  40 meq PO ASDIR PRN


   PRN Reason: FOR SERUM K+  2.5 - 3.5


Potassium Chloride (Klor-Con)  40 meq PER TUBE ASDIR PRN


   PRN Reason: FOR SERUM K+ 2.5-3.5


   Last Admin: 10/10/18 07:41 Dose:  40 meq


Sodium Chloride (Flush - Normal Saline)  10 ml IVF PRN PRN


   PRN Reason: Saline Flush

## 2018-10-13 LAB
ALBUMIN SERPL BCG-MCNC: 3.7 G/DL (ref 3.4–4.8)
ALP SERPL-CCNC: 159 U/L (ref 40–150)
ALT SERPL W P-5'-P-CCNC: 24 U/L (ref 8–55)
ANALYZER IN CARDIO: (no result)
ANALYZER IN CARDIO: (no result)
ANION GAP SERPL CALC-SCNC: 11 MMOL/L (ref 10–20)
AST SERPL-CCNC: 38 U/L (ref 5–34)
BASE EXCESS STD BLDA CALC-SCNC: -4.6 MEQ/L
BASE EXCESS STD BLDA CALC-SCNC: -5.5 MEQ/L
BILIRUB SERPL-MCNC: 3 MG/DL (ref 0.2–1.2)
BUN SERPL-MCNC: 19 MG/DL (ref 9.8–20.1)
CA-I BLDA-SCNC: 1.19 MMOL/L (ref 1.12–1.3)
CA-I BLDA-SCNC: 1.25 MMOL/L (ref 1.12–1.3)
CALCIUM SERPL-MCNC: 8.9 MG/DL (ref 7.8–10.44)
CHLORIDE SERPL-SCNC: 118 MMOL/L (ref 98–107)
CO2 SERPL-SCNC: 19 MMOL/L (ref 23–31)
CREAT CL PREDICTED SERPL C-G-VRATE: 63 ML/MIN (ref 70–130)
GLOBULIN SER CALC-MCNC: 1.3 G/DL (ref 2.4–3.5)
GLUCOSE SERPL-MCNC: 119 MG/DL (ref 83–110)
HCO3 BLDA-SCNC: 17.8 MEQ/L (ref 22–28)
HCO3 BLDA-SCNC: 19.5 MEQ/L (ref 22–28)
HCT VFR BLDA CALC: 24 % (ref 36–47)
HCT VFR BLDA CALC: 24 % (ref 36–47)
HGB BLD-MCNC: 7.6 G/DL (ref 12–16)
HGB BLDA-MCNC: 8.3 G/DL (ref 12–16)
HGB BLDA-MCNC: 8.3 G/DL (ref 12–16)
MAGNESIUM SERPL-MCNC: 1.7 MG/DL (ref 1.6–2.6)
MAGNESIUM SERPL-MCNC: 1.9 MG/DL (ref 1.6–2.6)
MCH RBC QN AUTO: 30.5 PG (ref 27–31)
MCV RBC AUTO: 94.8 FL (ref 78–98)
MDIFF COMPLETE?: YES
O2 A-A PPRESDIFF RESPIRATORY: 244.93 MM[HG] (ref 0–20)
PCO2 BLDA: 23.8 MMHG (ref 35–45)
PCO2 BLDA: 35.5 MMHG (ref 35–45)
PH BLDA: 7.36 [PH] (ref 7.35–7.45)
PH BLDA: 7.49 [PH] (ref 7.35–7.45)
PLATELET # BLD AUTO: 28 THOU/UL (ref 130–400)
PLATELET BLD QL SMEAR: (no result)
PO2 BLDA: 113.5 MMHG (ref 70–?)
PO2 BLDA: 67.2 MMHG (ref 70–?)
POTASSIUM BLD-SCNC: 3.45 MMOL/L (ref 3.7–5.3)
POTASSIUM BLD-SCNC: 3.72 MMOL/L (ref 3.7–5.3)
POTASSIUM SERPL-SCNC: 3.8 MMOL/L (ref 3.5–5.1)
RBC # BLD AUTO: 2.48 MILL/UL (ref 4.2–5.4)
SODIUM SERPL-SCNC: 144 MMOL/L (ref 136–145)
SPECIMEN DRAWN FROM PATIENT: (no result)
SPECIMEN DRAWN FROM PATIENT: (no result)
WBC # BLD AUTO: 15.7 THOU/UL (ref 4.8–10.8)

## 2018-10-13 RX ADMIN — Medication PRN MLS: at 04:12

## 2018-10-13 RX ADMIN — DEXTROSE AND SODIUM CHLORIDE SCH MLS: 5; 200 INJECTION, SOLUTION INTRAVENOUS at 04:11

## 2018-10-13 RX ADMIN — ALBUMIN HUMAN SCH GM: 250 SOLUTION INTRAVENOUS at 00:10

## 2018-10-13 RX ADMIN — ALBUMIN HUMAN SCH GM: 250 SOLUTION INTRAVENOUS at 05:56

## 2018-10-13 RX ADMIN — MEROPENEM AND SODIUM CHLORIDE SCH MLS: 1 INJECTION, SOLUTION INTRAVENOUS at 14:33

## 2018-10-13 RX ADMIN — FAMOTIDINE SCH MG: 10 INJECTION, SOLUTION INTRAVENOUS at 20:22

## 2018-10-13 RX ADMIN — MEROPENEM AND SODIUM CHLORIDE SCH MLS: 1 INJECTION, SOLUTION INTRAVENOUS at 02:12

## 2018-10-13 NOTE — PRG
DATE OF SERVICE:  10/13/2018

 

SERVICE:  Pulmonary Medicine.

 

INTERVAL HISTORY:  The patient is doing poorly from a respiratory standpoint.  
Yesterday, she had increasing work of breathing and was put on BiPAP.  She did 
well for a period of time, but then had increasing shortness of breath once 
again.  She became tachypneic and had started grunting.  She remains 
encephalopathic.  She does not follow any commands.  She is moving all 4 
extremities spontaneously.  Otherwise, there has been no interval change to her 
condition.  There have been no fevers or chills overnight.

 

PHYSICAL EXAMINATION:

VITAL SIGNS:  Afebrile currently.  T-max yesterday was 100.8, pulse 108, blood 
pressure 122/72, respirations 30, saturation 90% on BiPAP at 17:05.

GENERAL:  The patient is encephalopathic.

HEENT:  Normocephalic, atraumatic.  Sclerae are white, conjunctivae pink.  Oral 
mucosa is moist without lesions.

LUNGS:  Crackles are present throughout bilateral lung fields.  There is no 
prolonged expiratory phase or wheezing identified.

HEART:  Normal rate, regular.

ABDOMEN:  Soft, nontender, nondistended.  Bowel sounds are positive.

MUSCULOSKELETAL:  No cyanosis or clubbing.  There is diffuse pitting throughout.

GENITOURINARY:  Jenkins catheter in place.

NEUROLOGIC:  Grossly nonfocal.

 

LABORATORY DATA:  WBC 15.7, hemoglobin 7.6, platelets have improved to 28.  
Band count is 13% and increasing.  INR 1.4.  Basic metabolic profile and liver 
function studies are essentially unremarkable.  The total bilirubin is down 
trending as of the AST.  Chloride 118.  E. coli is growing in the aspirate 
which is pansensitive.

 

ASSESSMENT:

1.  Severe sepsis secondary to pyelonephritis from obstructing stone.

2.  Nephrolithiasis, status post stent.

3.  Metabolic encephalopathy.

4.  Acute hypoxic respiratory failure.

5.  DIC, resolving.

6.  Deconditioning with weakness.

 

DISCUSSION AND PLAN:  We are going to give the patient a couple of doses of 
Lasix.  I will change her fluids over to D5 water to prevent her from becoming 
hypernatremic.  Over the next 24 hours, if things do not improve, family will 
discuss whether or not they want to proceed with repeat intubation.  We will 
keep a very close eye on her through the day.  I have titrated her BiPAP at 
bedside.  We are going to get an ABG and stop her IV fluids.

 

CRITICAL CARE TIME:  30 minutes.

 

YEHUDA

## 2018-10-13 NOTE — PRG
DATE OF SERVICE:  10/13/2018

 

SUBJECTIVE:  The patient was extubated approximately 1-2 days ago.  Since then, she has had significa
nt respiratory distress.  She was more alert and following commands yesterday and has declined today 
to the point where she is no longer following commands and is having significant respiratory issues. 
 She is not conversive and there is no family at bedside.

 

OBJECTIVE:

VITAL SIGNS:  Temperature 98.4, pulse 109, blood pressure 105/69, respirations 33, saturation 92% on 
room air.

GENERAL:  The patient is currently on BiPAP, is not following commands.  Eyes are closed.  She appear
s to be in fairly significant distress.

CARDIOVASCULAR:  Sinus tachycardia, normal S1 and S2.

CHEST:  Significantly tachypneic with increased work of breathing.  It appeared that she is starting 
to fatigue from a respiratory effort.

ABDOMEN:  Soft, nontender and nondistended.

GENITOURINARY:  Jenkins catheter in place with fabrice colored urine, no clots or significant hematuria.

EXTREMITIES:  No clubbing, cyanosis or edema.

 

LABORATORY DATA:  On laboratory evaluation, a full set of labs are in the [employer] system, which I ha
ve reviewed.  Of note, patient's white count is currently 15.7 with hemoglobin of 7.6, platelet count
 is 28,000 after transfusion of platelets.  INR is currently 1.4, creatinine is 0.86.  The patient is
 currently still on 2 mcg of Levophed.

 

ASSESSMENT AND PLAN:  A 73-year-old white female with urosepsis secondary to ureteral stone with sept
ic encephalopathy, positive blood cultures and urine cultures, extubated, but with significant work o
f breathing.  She appears to be tiring out and a Critical Care Medicine and myself agreed that she wo
uld likely need to be reintubated.  Dr. Guerrero who is covering for Dr. Dale suggest that if she kyle
s get reintubated due to fatigue which is what appears to be occurring, she will likely need a trache
ostomy.  They are going to discuss with the family the patient's long-term wishes and goals in regard
s to whether she will get a trach and PEG versus potential hospice therapy instead.  I will leave the
 final determination between the family and Critical Care team.  From my standpoint, the patient has 
been stented and her urine remains clear.  We would recommend continuing to try to keep platelet coun
ts ideally above 50,000, but at least no lower than 20,000 to avoid spontaneous bleeding.  We will co
ntinue to monitor and make recommendations.  She should continue IV antibiotics per Medicine and Crit
ical Care team.

## 2018-10-13 NOTE — PDOC.PN
- Subjective


Encounter Start Date: 10/13/18


Encounter Start Time: 10:50


-: non-verbal





Patient seen and examined for Sepsis/Resp failure. Obtunded - on BiPAP. No new 

complaints. No overnight events





- Objective


Resuscitation Status: 


 











Resuscitation Status           FULL:Full Resuscitation














MAR Reviewed: Yes


Vital Signs & Weight: 


 Vital Signs (12 hours)











  Temp Pulse Pulse Ox


 


 10/13/18 19:07   97 


 


 10/13/18 16:00  98.6 F  


 


 10/13/18 15:01   107 H 


 


 10/13/18 14:56    87 L


 


 10/13/18 12:00  99.3 F  


 


 10/13/18 10:34   117 H 


 


 10/13/18 08:00  98.2 F  


 


 10/13/18 07:59   108 H 








 Weight











Admit Weight                   134 lb 8 oz


 


Weight                         136 lb 14.513 oz











 Most Recent Monitor Data











Heart Rate from ECG            102


 


NIBP                           111/72


 


NIBP BP-Mean                   92


 


Respiration from ECG           28


 


SpO2                           100














I&O: 


 











 10/12/18 10/13/18 10/14/18





 06:59 06:59 06:59


 


Intake Total 1373 2275.3 936


 


Output Total 1385 2165 2735


 


Balance -12 110.3 -1799











Result Diagrams: 


 10/14/18 03:42





 10/14/18 03:42


Additional Labs: 


 Accuchecks











  10/13/18 10/13/18 10/13/18





  17:28 11:24 04:11


 


POC Glucose  99  102  115 H














  10/12/18





  22:15


 


POC Glucose  123 H











EKG Reviewed by me: Yes





Phys Exam





- Physical Examination


Constitutional: NAD


Respiratory: no wheezing


Coarse BS b/l


Cardiovascular: RRR, no rub


Gastrointestinal: soft, positive bowel sounds


Musculoskeletal: no edema


Obtunded





Dx/Plan





- Plan


DVT proph w/SCDs





IMPRESSION:


1.  Severe sepsis with acute organ dysfunction/septic shock secondary to 

complicated urinary tract infection.  s/p ureteral stent placement


2.  Acute hypoxic respiratory failure - extubated 10/12


3.  Acute kidney injury on chronic kidney disease stage 2 secondary to #1.


4.  Metabolic acidosis/lactic acidosis.  


5.  Thrombocytopenia with coagulopathy, probably secondary to sepsis.  


6.  Electrolyte abnormality - hypokalemia, hypophosphatemia, and hypomagnesemia.


7.  Abnormal liver function tests.


8.  External Hemorrhoid/New Bladder cancer


9.  Deep venous thrombosis prophylaxis with sequential compression devices.  


 


PLAN:


Cont Atbx/pressors/NIPPV


Cont current IVF


Cont other meds as below


AM labs








Review of Systems





- Review of Systems


Other: 





Cannot obtain due to current mentation.





- Medications/Allergies


Allergies/Adverse Reactions: 


 Allergies











Allergy/AdvReac Type Severity Reaction Status Date / Time


 


No Known Drug Allergies Allergy   Verified 08/27/18 15:57











Medications: 


 Current Medications





Acetaminophen (Tylenol Elixir)  650 mg PO Q6H PRN


   PRN Reason: Fever > 101 or Mild Pain


Acetaminophen (Tylenol)  650 mg MI Q6H PRN


   PRN Reason: Fever > 101 or Mild Pain


   Last Admin: 10/13/18 02:21 Dose:  650 mg


Albuterol/Ipratropium (Duoneb)  3 ml NEB M1PG-OT PRN


   PRN Reason: SOB &/or Wheezing


Bisacodyl (Dulcolax)  10 mg MI DAILYPRN PRN


   PRN Reason: Constipation


Famotidine (Pepcid)  20 mg SLOW IVP Q24HR ALEJANDRO


   Last Admin: 10/12/18 08:53 Dose:  20 mg


Furosemide (Lasix)  40 mg SLOW IVP DAILY ALEJANDRO


Norepinephrine Bitartrate (Levophed)  250 mls @ 0 mls/hr IVPB PRN PRN; Protocol


   PRN Reason: To maintain MAP > 65


   Last Admin: 10/13/18 04:12 Dose:  250 mls


Meropenem 1 gm/ Device  50 mls @ 100 mls/hr IVPB 0200,1400 ALEJANDRO


   Last Admin: 10/13/18 14:33 Dose:  50 mls


Dextrose/Water (D5w)  1,000 mls @ 50 mls/hr IV .Q20H ALEJANDRO


   Last Admin: 10/13/18 10:32 Dose:  1,000 mls


Potassium Phosphate 30 mmol/Magnesium Sulfate 2 gm/Miscellaneous Medication 1 

each/ Sodium Chloride  514 mls @ 64.25 mls/hr IVPB NOW ALEJANDRO


   Stop: 10/13/18 23:00


   Last Admin: 10/13/18 14:58 Dose:  514 mls


Dexmedetomidine HCl 200 mcg/ (Sodium Chloride)  50 mls @ 0 mls/hr IVPB INF ALEJANDRO; 

Protocol


   Last Admin: 10/13/18 16:16 Dose:  50 mls


Lorazepam (Ativan)  0.5 mg SLOW IVP Q2H PRN


   PRN Reason: Anxiety/Agitation


Mineral Oil/White Petrolatum (Eucerin Cream)  0 gm TOP BIDPRN PRN


   PRN Reason: Dry Skin


Miscellaneous Medication (Pharmacy To Dose)  1 each IVPB PRN PRN


   PRN Reason: Pharmacy to dose


Discontinue Previous Narcotic Pain Medications And Benzodiazepines  1 each FS 

.ONE ALEJANDRO


   Stop: 11/08/18 16:45


Ondansetron HCl (Zofran)  4 mg IVP Q6H PRN


   PRN Reason: Nausea/Vomiting


   Last Admin: 10/09/18 22:54 Dose:  4 mg


Ondansetron HCl (Zofran)  4 mg IVP Q4H PRN


   PRN Reason: Nausea/Vomiting


Sodium Chloride (Flush - Normal Saline)  10 ml IVF PRN PRN


   PRN Reason: Saline Flush

## 2018-10-14 LAB
ALBUMIN SERPL BCG-MCNC: 3.4 G/DL (ref 3.4–4.8)
ALP SERPL-CCNC: 174 U/L (ref 40–150)
ALT SERPL W P-5'-P-CCNC: 16 U/L (ref 8–55)
ANALYZER IN CARDIO: (no result)
ANION GAP SERPL CALC-SCNC: 10 MMOL/L (ref 10–20)
AST SERPL-CCNC: 29 U/L (ref 5–34)
BASE EXCESS STD BLDA CALC-SCNC: -3.1 MEQ/L
BILIRUB SERPL-MCNC: 2.9 MG/DL (ref 0.2–1.2)
BUN SERPL-MCNC: 27 MG/DL (ref 9.8–20.1)
CA-I BLDA-SCNC: 1.12 MMOL/L (ref 1.12–1.3)
CALCIUM SERPL-MCNC: 8.3 MG/DL (ref 7.8–10.44)
CHLORIDE SERPL-SCNC: 114 MMOL/L (ref 98–107)
CO2 SERPL-SCNC: 23 MMOL/L (ref 23–31)
CREAT CL PREDICTED SERPL C-G-VRATE: 61 ML/MIN (ref 70–130)
GLOBULIN SER CALC-MCNC: 1.4 G/DL (ref 2.4–3.5)
GLUCOSE SERPL-MCNC: 115 MG/DL (ref 83–110)
HCO3 BLDA-SCNC: 19.7 MEQ/L (ref 22–28)
HCT VFR BLDA CALC: 24 % (ref 36–47)
HGB BLD-MCNC: 7.7 G/DL (ref 12–16)
HGB BLDA-MCNC: 8.2 G/DL (ref 12–16)
MAGNESIUM SERPL-MCNC: 2.1 MG/DL (ref 1.6–2.6)
MCH RBC QN AUTO: 30.2 PG (ref 27–31)
MCV RBC AUTO: 93.6 FL (ref 78–98)
MDIFF COMPLETE?: YES
O2 A-A PPRESDIFF RESPIRATORY: 294.5 MM[HG] (ref 0–20)
PCO2 BLDA: 27.1 MMHG (ref 35–45)
PH BLDA: 7.48 [PH] (ref 7.35–7.45)
PLATELET # BLD AUTO: 35 THOU/UL (ref 130–400)
PLATELET BLD QL SMEAR: (no result)
PO2 BLDA: 92.3 MMHG (ref 70–?)
POTASSIUM BLD-SCNC: 4.8 MMOL/L (ref 3.7–5.3)
POTASSIUM SERPL-SCNC: 3.6 MMOL/L (ref 3.5–5.1)
RBC # BLD AUTO: 2.57 MILL/UL (ref 4.2–5.4)
SODIUM SERPL-SCNC: 143 MMOL/L (ref 136–145)
SPECIMEN DRAWN FROM PATIENT: (no result)
WBC # BLD AUTO: 19.5 THOU/UL (ref 4.8–10.8)

## 2018-10-14 RX ADMIN — POTASSIUM CHLORIDE SCH MLS: 29.8 INJECTION, SOLUTION INTRAVENOUS at 10:01

## 2018-10-14 RX ADMIN — MEROPENEM AND SODIUM CHLORIDE SCH MLS: 1 INJECTION, SOLUTION INTRAVENOUS at 21:14

## 2018-10-14 RX ADMIN — MEROPENEM AND SODIUM CHLORIDE SCH MLS: 1 INJECTION, SOLUTION INTRAVENOUS at 14:21

## 2018-10-14 RX ADMIN — MEROPENEM AND SODIUM CHLORIDE SCH MLS: 1 INJECTION, SOLUTION INTRAVENOUS at 01:21

## 2018-10-14 RX ADMIN — POTASSIUM CHLORIDE SCH MLS: 29.8 INJECTION, SOLUTION INTRAVENOUS at 14:22

## 2018-10-14 RX ADMIN — FAMOTIDINE SCH MG: 10 INJECTION, SOLUTION INTRAVENOUS at 20:48

## 2018-10-14 NOTE — RAD
FRONTAL VIEW CHEST:

 

Indication: Ventilated patient. Follow up. 

 

FINDINGS: 

There is an endotracheal tube which has been placed with the tip at the level of thoracic inlet. Righ
t subclavian venous catheter remains. There is diffuse abnormal alveolar and interstitial opacity thr
oughout each lung. Cardiac silhouette is prominent. 

 

IMPRESSION: 

1. Persistent diffuse pulmonary parenchymal opacification bilaterally.

2. Interval placement of endotracheal tube with the tip at the level of the thoracic inlet. 

3. Recommend continued follow up to resolution.

 

POS: [facility]

## 2018-10-14 NOTE — RAD
CHEST 1 VIEW:

 

Date:  10/14/18 

 

HISTORY:  

73-year-old female with history of hypoxic respiratory failure. 

 

COMPARISON:  

10/11/18. 

 

FINDINGS:

The previously noted NG tube and endotracheal tubes have been removed. Right subclavian catheter stefani
ins in place. There is new very extensive diffuse alveolar and interstitial parenchymal changes throu
ghout both lungs with slight costophrenic angle blunting, evidence for developing extensive bilateral
 edema and/or bilateral pneumonia. There is moderate gaseous distention of the stomach. Left-sided ur
eteral stent is noted. There is rotation to the left. 

 

IMPRESSION: 

Extensive interval development of bilateral alveolar and interstitial parenchymal changes throughout 
both lungs since the prior study. Probable small pleural effusions. Moderate gaseous distention of th
e stomach. 

 

 

 

 

POS: [facility]

## 2018-10-14 NOTE — PRG
DATE OF SERVICE:  10/14/2018

 

SERVICE:  Pulmonary Medicine.

 

INTERVAL HISTORY:  The patient is doing well from a respiratory standpoint.  We 
took her off of the BiPAP, however, and she had an abrupt desaturation.  She 
only lasted about 2 minutes off.  Her sats were in the 70s.  We probably put 
her back on the BiPAP and she recovered comfortably.  She is currently resting 
very easily.  She is in a sedated delirium right now.  She cannot provide me 
any additional elements of the history, but there were essentially no events 
overnight other than episodes of agitation.  These episodes are becoming less 
severe.  They are typically associated with hypoxemia.  She is no longer 
ripping the mask off her face, which is nice to see.

 

PHYSICAL EXAMINATION:

VITAL SIGNS:  Afebrile, pulse 91, blood pressure 108/66, respirations 25, 
saturation 95% on 53% FiO2 and a PEEP of 8.

GENERAL:  The patient is sedated, but comfortable.

HEENT:  Normocephalic, atraumatic.  Sclerae are white, conjunctivae pink.  Oral 
and nasal mucosa is moist without lesions.

LUNGS:  There is excellent air entry.  I do not hear prolonged expiratory 
phase.  Extensive crackles were once again present.  There is no rhonchi or 
wheezing to speak of.

HEART:  Normal rate.  Regular today.

ABDOMEN:  Soft, nontender, nondistended.  Bowel sounds are positive.

MUSCULOSKELETAL:  There is no cyanosis or clubbing.  The lower extremities have 
no edema, but she has 1-2+ pitting at the sacral region.

GENITOURINARY:  Jenkins catheter in place.

NEUROLOGIC:  Grossly nonfocal.  She is moving all 4 extremities spontaneously.

 

LABORATORY DATA:  WBC 19.5, hemoglobin 7.7, platelets 35 and responding.  Her 
band count has increased to 23%.  INR 1.4.  Basic metabolic profile and liver 
function studies are essentially unremarkable.  The total bilirubin is trending 
downward to 2.9.  Alkaline phosphatase 174.  Sodium 143 and up-trending, 
chloride 114 and up-trending.  Creatinine remains low at 0.83.  E. coli is 
growing which is a pansensitive organism.

 

ASSESSMENT:

1.  Septic shock, secondary to pyelonephritis from an obstructing stone.

2.  Nephrolithiasis, status post ureteral stent.

3.  Acute hypoxic respiratory failure, secondary to volume overload from her 
resuscitation.

4.  Disseminated intravascular coagulation, resolving.

5.  Deconditioning with weakness.

6.  Metabolic encephalopathy.

7.  Delirium superimposed on dementia, currently sedated.

 

DISCUSSION AND PLAN:  We are going to continue to diurese the patient through 
time.  Her sodium and chloride have trended upward.  As such, we will continue 
our free water and actually push the rate ever so slightly.  We will continue 
giving BiPAP breaks 3 times daily and increase as tolerated.  The patient's 
family is adamant that they be transitioned over to [facility].  They 
would like to go to [place], but I indicated that I would be uncomfortable 
sending her to [place] without endotracheal intubation, which they do not want.  
As such, they would like to try to make the transition over to the [place] [facility].  We will see if we can facilitate that at some point 
today.  We will wean the Precedex gently through time.  If we can keep her 
comfortable and awake, we will try to make attempts at mobilizing her into a 
chair.  That being said, in her current state that is not possible.

 

CRITICAL CARE TIME:  30 minutes.

 

YEHUDA

## 2018-10-14 NOTE — PRG
DATE OF SERVICE:  10/14/2018.

 

SUBJECTIVE:  The patient is doing better today.  She ended up not needing a tracheostomy or repeat in
tubation yesterday after discussion with the family.  She was given Precedex and her breathing has ca
lmed down.  It was felt that the majority of her agitation and distress yesterday appear to be second
александр to delirium.  Her vitals and labs are also improving.  The family has requested transfer to [facility] as that is where one of daughters works and [facility]

 

OBJECTIVE:

VITAL SIGNS:  Temperature 97, pulse 91, blood pressure 108/66, respirations 26, saturation 95% on BiP
AP.

GENERAL:  Appears to be less in distress.  She is resting comfortably, not agitated.

HEENT:  Eyes are closed and she is not really responsive otherwise, but does react to stimuli.  BiPAP
 in place.  Eyes are closed.  Trachea midline.

CHEST:  Bilateral crackles, persistent tachypnea, although improved from previous, last work of karsten dockery.

ABDOMEN:  Soft, nontender, nondistended, positive bowel sounds.

GENITOURINARY:  Jenkins catheter in place, secured with clear yellow urine.

EXTREMITIES:  1+ edema bilaterally.  Otherwise, no clubbing or cyanosis.

 

LABORATORY DATA:

On laboratory evaluation, the full set of labs are in the [employer] system, which I have reviewed.  Of
 note, the patient's white count today is 19.5 and hemoglobin 7.7, platelet count has improved to 35.
  Creatinine today is 0.83, bilirubin of 2.9.

 

ASSESSMENT AND PLAN:  A 73-year-old white female with severe sepsis with current volume overload and 
some respiratory distress, although this is improving.  From a urologic standpoint, she is already st
ented and her urine remains clear.  Her platelets are improving and she has not had any hematuria.  T
he critical care team is okay with leaving her off of intubation or tracheostomy and just keeping her
 on BiPAP for now.  Since the family has requested transfer of care to [facility], the transfer is being arranged by the critical care team.  We will continue to follow on that 
campus as well to ensure that she is doing okay, but from this standpoint, no further surgical interv
entions are necessary as the patient has already been stented which was the necessary treatment to pr
event progression of her sepsis towards death.  At this point, we would just wait until she has adequ
ately recovered before giving consideration to a future urologic surgery to remove the obstructing st
one.  I will sign out to Dr. Matthew guo as she will resume care tomorrow.

## 2018-10-15 VITALS — TEMPERATURE: 99.8 F

## 2018-10-15 VITALS — SYSTOLIC BLOOD PRESSURE: 128 MMHG | DIASTOLIC BLOOD PRESSURE: 81 MMHG

## 2018-10-15 LAB
ALBUMIN SERPL BCG-MCNC: 3.1 G/DL (ref 3.4–4.8)
ALP SERPL-CCNC: 164 U/L (ref 40–150)
ALT SERPL W P-5'-P-CCNC: 13 U/L (ref 8–55)
ANALYZER IN CARDIO: (no result)
ANION GAP SERPL CALC-SCNC: 11 MMOL/L (ref 10–20)
AST SERPL-CCNC: 25 U/L (ref 5–34)
BASE EXCESS STD BLDA CALC-SCNC: -0.4 MEQ/L
BILIRUB SERPL-MCNC: 2.2 MG/DL (ref 0.2–1.2)
BUN SERPL-MCNC: 36 MG/DL (ref 9.8–20.1)
CA-I BLDA-SCNC: 1.14 MMOL/L (ref 1.12–1.3)
CALCIUM SERPL-MCNC: 8.6 MG/DL (ref 7.8–10.44)
CHLORIDE SERPL-SCNC: 112 MMOL/L (ref 98–107)
CO2 SERPL-SCNC: 22 MMOL/L (ref 23–31)
CREAT CL PREDICTED SERPL C-G-VRATE: 61 ML/MIN (ref 70–130)
GLOBULIN SER CALC-MCNC: 1.8 G/DL (ref 2.4–3.5)
GLUCOSE SERPL-MCNC: 138 MG/DL (ref 83–110)
HCO3 BLDA-SCNC: 22.1 MEQ/L (ref 22–28)
HCT VFR BLDA CALC: 25 % (ref 36–47)
HGB BLD-MCNC: 7.3 G/DL (ref 12–16)
HGB BLDA-MCNC: 8.4 G/DL (ref 12–16)
MAGNESIUM SERPL-MCNC: 1.9 MG/DL (ref 1.6–2.6)
MCH RBC QN AUTO: 30.4 PG (ref 27–31)
MCV RBC AUTO: 93.2 FL (ref 78–98)
MDIFF COMPLETE?: YES
O2 A-A PPRESDIFF RESPIRATORY: 197.74 MM[HG] (ref 0–20)
PCO2 BLDA: 28.3 MMHG (ref 35–45)
PH BLDA: 7.51 [PH] (ref 7.35–7.45)
PLATELET # BLD AUTO: 58 THOU/UL (ref 130–400)
PLATELET BLD QL SMEAR: (no result)
PO2 BLDA: 102 MMHG (ref 70–?)
POTASSIUM BLD-SCNC: 4.19 MMOL/L (ref 3.7–5.3)
POTASSIUM SERPL-SCNC: 4.3 MMOL/L (ref 3.5–5.1)
RBC # BLD AUTO: 2.39 MILL/UL (ref 4.2–5.4)
SODIUM SERPL-SCNC: 141 MMOL/L (ref 136–145)
SPECIMEN DRAWN FROM PATIENT: (no result)
WBC # BLD AUTO: 14 THOU/UL (ref 4.8–10.8)

## 2018-10-15 PROCEDURE — 0BH17EZ INSERTION OF ENDOTRACHEAL AIRWAY INTO TRACHEA, VIA NATURAL OR ARTIFICIAL OPENING: ICD-10-PCS | Performed by: INTERNAL MEDICINE

## 2018-10-15 PROCEDURE — 5A1935Z RESPIRATORY VENTILATION, LESS THAN 24 CONSECUTIVE HOURS: ICD-10-PCS | Performed by: INTERNAL MEDICINE

## 2018-10-15 RX ADMIN — MEROPENEM AND SODIUM CHLORIDE SCH MLS: 1 INJECTION, SOLUTION INTRAVENOUS at 08:07

## 2018-10-15 RX ADMIN — MEROPENEM AND SODIUM CHLORIDE SCH MLS: 1 INJECTION, SOLUTION INTRAVENOUS at 05:49

## 2018-10-15 RX ADMIN — Medication PRN MLS: at 08:02

## 2018-10-15 NOTE — OP
DATE OF SERVICE:   10/14/2018

 

SERVICE:  Pulmonary Medicine

 

PROCEDURE:  Endotracheal intubation.

 

CONSENT:  Procedure was performed secondary to clinical deterioration and respiratory failure.

 

STAFF PHYSICIAN:  Andrea Guerrero M.D.

 

MEDICATIONS USED:  

1.  Versed 2 mg IV push.

2.  Etomidate 20 mg IV push.

 

PREOPERATIVE DIAGNOSES:

1.  Acute hypoxic respiratory failure.

2.  Metabolic encephalopathy.

 

POSTPROCEDURE DIAGNOSES:

1.  Acute hypoxic respiratory failure.

2.  Metabolic encephalopathy.

 

DESCRIPTION OF PROCEDURE:  Vital sign monitoring was accomplished by noninvasive hemodynamic monitori
ng, pulse oximetry and telemetry.  In the supine position, the patient was preoxygenated with the BiP
AP.  She was maintained with saturations of 100%.  Following induction of anesthesia, a #3 GlideScope
 was inserted through the mouth offering clear identification of the posterior oropharynx and larynge
al structures with grade I view.  An endotracheal tube was visualized passing through the vocal cords
.  Placement was confirmed by condensation in the endotracheal tube, colorimetric capnography, and by
 axillary chest auscultation.  Endotracheal tube was secured at 23 cm, measured at the gums.  The pat
ient was placed on mechanical ventilation with good return of volumes.  Post-procedure x-ray demonstr
ated good location for the endotracheal tube.

 

ESTIMATED BLOOD LOSS:  None.

 

COMPLICATIONS:  None.

## 2018-10-15 NOTE — PDOC.PN
- Subjective


Encounter Start Date: 10/15/18


Encounter Start Time: 10:31


Subjective: intubated, sedated





- Objective


Resuscitation Status: 


 











Resuscitation Status           FULL:Full Resuscitation














MAR Reviewed: Yes


Vital Signs & Weight: 


 Vital Signs (12 hours)











  Temp Pulse Resp BP Pulse Ox


 


 10/15/18 06:59   90   111/70 


 


 10/15/18 06:57   90  31 H   100


 


 10/15/18 04:00  99.4 F    


 


 10/15/18 02:11   94   


 


 10/15/18 01:04   83  29 H   100


 


 10/15/18 00:00  99 F   28 H  


 


 10/14/18 23:36   87   








 Weight











Admit Weight                   134 lb 8 oz


 


Weight                         141 lb 12.116 oz











 Most Recent Monitor Data











Heart Rate from ECG            90


 


NIBP                           118/73


 


NIBP BP-Mean                   80


 


Respiration from ECG           25


 


SpO2                           97














I&O: 


 











 10/14/18 10/15/18 10/16/18





 06:59 06:59 06:59


 


Intake Total 1730 2045 


 


Output Total 3400 5049 


 


Balance -2610 -719 











Result Diagrams: 


 10/15/18 05:40





 10/15/18 05:40


Additional Labs: 


 Accuchecks











  10/15/18 10/15/18 10/14/18





  05:55 00:39 18:41


 


POC Glucose  137 H  134 H  103














  10/14/18





  14:18


 


POC Glucose  93














Phys Exam





- Physical Examination


Neck: no JVD


coarse BS


Cardiovascular: irregular


tachy


Gastrointestinal: soft, positive bowel sounds


Musculoskeletal: edema present





Dx/Plan


(1) Sepsis due to Escherichia coli (E. coli)


Code(s): A41.51 - SEPSIS DUE TO ESCHERICHIA COLI [E. COLI]   Status: Acute   





(2) UTI (urinary tract infection), bacterial


Code(s): N39.0 - URINARY TRACT INFECTION, SITE NOT SPECIFIED; A49.9 - BACTERIAL 

INFECTION, UNSPECIFIED   Status: Acute   





(3) Thrombocytopenia


Code(s): D69.6 - THROMBOCYTOPENIA, UNSPECIFIED   Status: Acute   





(4) Acute renal failure


Status: Acute   


Qualifiers: 


   Acute renal failure type: unspecified   Qualified Code(s): N17.9 - Acute 

kidney failure, unspecified   





(5) Acute respiratory failure with hypoxemia


Code(s): J96.01 - ACUTE RESPIRATORY FAILURE WITH HYPOXIA   Status: Acute   





(6) Urinary tract obstruction by kidney stone


Code(s): N20.0 - CALCULUS OF KIDNEY; N13.8 - OTHER OBSTRUCTIVE AND REFLUX 

UROPATHY   Status: Acute   





- Plan


cont current plan of care


vent per intensivist


-: cont iv meropenem


-: discuss with intensivist/ 





* .

## 2018-10-15 NOTE — PRG
DATE OF SERVICE:  10/15/2018

 

SERVICE:  Pulmonary Medicine.

 

INTERVAL HISTORY:  The patient is doing outstanding from a respiratory standpoint.  She cannot provid
e any additional elements of the history because she is sedated.  That being said, there were no sign
ificant overnight events.  If anything, she has settled down quite a bit.  There has been no signific
ant fevers or chills.  Her mentation has not been an issue because we have been able to keep her more
 comfortable.

 

PHYSICAL EXAMINATION:

VITAL SIGNS:  Afebrile, pulse 92, blood pressure 112/68, respirations 22, saturation 100% on 40% FiO2
 and a PEEP of 9.

GENERAL:  The patient is intubated and sedated.

HEENT:  Normocephalic, atraumatic.  Sclerae are white.  Conjunctivae are pink.  Oral mucosa is moist 
without lesions.

LUNGS:  Rhonchi and crackles are present throughout bilateral lung fields.  There is no prolonged exp
iratory phase or wheezing present.

HEART:  Normal rate, regular.

ABDOMEN:  Soft, nontender, nondistended.  Bowel sounds are positive.

MUSCULOSKELETAL:  No cyanosis or clubbing.  There is no pitting in the bilateral lower extremities.

NEUROLOGIC:  Grossly nonfocal.

 

LABORATORY DATA:  WBC 14.0, hemoglobin 7.3, platelets 58,000.  Basic metabolic profile is essentially
 unremarkable.  Chloride is down trending to 112, sodium down trending to 141.  BUN is 37, creatinine
 0.86.  Basic metabolic profile and liver function studies are otherwise completely unremarkable.  To
ike bilirubin is down trending nicely to 2.2.  Blood cultures x2 and urine culture x2 are both positi
ve for pansensitive E. coli.

 

ASSESSMENT:

1.  Septic shock secondary to pyelonephritis from obstructing stone.

2.  Acute hypoxic respiratory failure secondary to volume overload and possible aspiration related ch
anges.

3.  Nephrolithiasis, status post ureteral stent.

4.  Disseminated intravascular coagulopathy, resolving.

5.  Deconditioning with weakness.

6.  Delirium, superimposed on possible early cognitive impairment.

 

DISCUSSION AND PLAN:  The patient is clearly getting better.  I will wean oxygen through time as tolalberto
rated.  She remains a little volume overloaded.  As such, we will continue our daily doses of Lasix. 
 Steroids will be interrupted.  Pulmonary Critical Care will continue to follow along if she remains 
in house.  I did put in a Doc to Doc with Dr. Dao over at [facility].  She is a second year fe
[facility]erry.  She has accepted the patient and as soon as they have a bed available, we will transition out 
to a different facility.  We have spoken with the patient's family.  They do appreciate that they adis
l be out of pocket for the travel expenses.

 

CRITICAL CARE TIME:  30 minutes.

## 2018-10-15 NOTE — DIS
DATE OF ADMISSION:  10/09/2018

 

DATE OF TRANSFER:  Transferred to [facility], 10/15/2018

 

TRANSFER OF CARE

 

PRIMARY CARE PROVIDER:  Emperatriz Valerio D.O.

 

FINAL DIAGNOSES:  Sepsis, urinary tract infection, acute renal failure, acidosis, thrombocytopenia se
condary to disseminated intravascular coagulation, renal stones, lactic acidosis, hydronephrosis with
 obstructing left renal stone.

 

MEDICINES AT TIME OF DISCHARGE:  Pepcid 20 mg IV push q.24 hours, Lasix 40 mg IV push daily, DuoNeb 3
 mL q.6 p.r.n., meropenem 1 gram q.8 hours IV.

 

ALLERGIES:  No known drug allergies.

 

CONSULTATIONS DURING HOSPITAL STAY:  Dr. Ebenezer Srivastava, 10/09/2018; Dr. Wong Dale, Pulmonology, 10/
09/2018 and Dr. Clarence Roe, General Surgery, 10/10/2018.

 

PROCEDURES:  Cystoscopy, bilateral J ureteral stent placement, left retrograde pyelogram 10/09/2018, 
endotracheal intubation 10/15/2018.

 

HOSPITAL COURSE:  The patient was admitted to the hospital by Dr. Rosey Castro with urosepsis an
d left obstructing ureteral calculi.  She was given meropenem, taken to the operating room, postopera
tive consults mentioned were obtained.  Laboratory on admission, CBC, white count 9.1 with a marked l
eft shift, including 24% bands, hemoglobin 10.0, platelet count low at 44,000.  INR was 1.9.  Blood g
as on 10/10/2018 revealed a pH of 7.43, pCO2 21.5, pO2 133.7.  Chemistries, lactic acid on the day of
 admission 6.5, calcium 7.2, phosphorus 1.9, magnesium 1.2.  Liver function test normal.  Creatinine 
1.3, BUN 22, CO2 18, chloride 112, sodium 139, potassium 3.5.  The patient's culture eventually grew 
out E. coli pansensitive.  During her hospital stay, the patient developed DIC with platelet counts a
s low as 17,000 and 7000 on 10/11/2018 and 10/12/2018.  White cell counts were elevated at that time 
with continued left shift.  White cell count was 19.7 on 10/11/2018.  The patient was found to have e
levated PT _____ a low fibrinogen and fibrin degradation products.  She had a diagnosis of DIC at [facility]
t time.  Her blood sugars were transiently low and came up on 10/10/2018.  She had a creatinine of 1.
29, BUN of 21, CO2 of 16, chloride of 114, potassium 2.9, sodium 137.  Her lactic acid slowly improve
d.  The low measured was 2.4 on 10/13/2018.  On 10/14/2018, sodium 141, potassium 4.3, chloride 112, 
CO2 22, BUN 36, creatinine 0.86.  Blood sugars were in the normal range on 10/15/2018.  She required 
intubation and mechanical ventilation.  Her physical exam continued to have rhonchi and rales through
out the lung fields.  Heart rate was normal.  Abdomen was benign.  Extremities revealed no cyanosis, 
clubbing or edema.  She is considered at this time recovering from septic shock due to pyelonephritis
.  She had an acute hypoxic respiratory failure secondary to volume overload and possible aspiration 
DIC resolving delirium.  Her family requested that she be transferred to [facility] in [place], Dr. Andrea Guerrero did a Doc-to-Doc with Dr. Dao at [facility].  She accepted the patient.  The p
marion has been transferred to [facility] for ongoing therapy.

## 2018-10-15 NOTE — PRG
DATE OF SERVICE:  10/15/2018

 

SUBJECTIVE:  The patient is sedated.

 

PHYSICAL EXAMINATION: 

VITAL SIGNS:  T-max 99.4, blood pressure 117/70, heart rate is in the 90s 
remains on Levophed 2 mcg.  I's and O's 2045 in, 2700 of urine output yellow, 
clear.

ABDOMEN:  Soft.  No rigidity, no rebound.

 

LABORATORY DATA:  White count decreased from 19 to 14, hemoglobin 7.3, platelet 
58.  Renal function 0.8.

 

Urine culture and blood culture demonstrating E. coli.

 

IMPRESSION AND PLAN:

1.  A 73-year-old female with E. coli urosepsis.

2.  History of left distal ureteral stone x2 with hydronephrosis.

3.  Right renal pelvic proximal ureteral stone, status post cystoscopy, 
bilateral stent.

4.  Cystoscopy demonstrated incidental trigonal bladder mass approximately 1.5 
cm.

5.  Multiorgan failure due to sepsis/DIC history of severe thrombocytopenia.

 

RECOMMENDATIONS:  The patient remains in the Critical Care Unit.  Prognosis 
remains guarded.  Despite her severe history of thrombocytopenia she has had no 
issues with significant hematuria of concern.  Events over the weekend 
reviewed.  The patient's family requesting her to be transferred to [facility] in [place].  Per nursing staff, coordination of this process is in the 
works.  From a urologic perspective, recommend observation as she has bilateral 
stents in with history of hydronephrosis and urolithiasis.  She is too ill to 
proceed with ureteroscopy, laser lithotripsy.  At a later date, ureteroscopy, 
laser lithotripsy and TURBT would be advised.  As family is requesting transfer 
to [facility] for personal reasons, urologic consultation at [facility] is advised for continuity of care.  Monitor daily labs, prevent severe 
thrombocytopenia to prevent critical bleed.  Continue current antibiotic 
regimen.

 

MTDD

## 2018-11-14 ENCOUNTER — HOSPITAL ENCOUNTER (OUTPATIENT)
Dept: HOSPITAL 92 - LABBT | Age: 73
Discharge: HOME | End: 2018-11-14
Attending: UROLOGY
Payer: MEDICARE

## 2018-11-14 DIAGNOSIS — N20.2: ICD-10-CM

## 2018-11-14 DIAGNOSIS — Z01.818: Primary | ICD-10-CM

## 2018-11-14 LAB
ANION GAP SERPL CALC-SCNC: 10 MMOL/L (ref 10–20)
APTT PPP: 32.5 SEC (ref 22.9–36.1)
BUN SERPL-MCNC: 22 MG/DL (ref 9.8–20.1)
CALCIUM SERPL-MCNC: 8.8 MG/DL (ref 7.8–10.44)
CHLORIDE SERPL-SCNC: 107 MMOL/L (ref 98–107)
CO2 SERPL-SCNC: 24 MMOL/L (ref 23–31)
CREAT CL PREDICTED SERPL C-G-VRATE: 0 ML/MIN (ref 70–130)
GLUCOSE SERPL-MCNC: 86 MG/DL (ref 83–110)
HGB BLD-MCNC: 11 G/DL (ref 12–16)
INR PPP: 1
MCH RBC QN AUTO: 30.8 PG (ref 27–31)
MCV RBC AUTO: 96.2 FL (ref 78–98)
PLATELET # BLD AUTO: 211 THOU/UL (ref 130–400)
POTASSIUM SERPL-SCNC: 4.3 MMOL/L (ref 3.5–5.1)
PROTHROMBIN TIME: 13.6 SEC (ref 12–14.7)
RBC # BLD AUTO: 3.56 MILL/UL (ref 4.2–5.4)
SODIUM SERPL-SCNC: 137 MMOL/L (ref 136–145)
WBC # BLD AUTO: 9 THOU/UL (ref 4.8–10.8)

## 2018-11-14 PROCEDURE — 87086 URINE CULTURE/COLONY COUNT: CPT

## 2018-11-14 PROCEDURE — 81001 URINALYSIS AUTO W/SCOPE: CPT

## 2018-11-14 PROCEDURE — 85610 PROTHROMBIN TIME: CPT

## 2018-11-14 PROCEDURE — 93010 ELECTROCARDIOGRAM REPORT: CPT

## 2018-11-14 PROCEDURE — 93005 ELECTROCARDIOGRAM TRACING: CPT

## 2018-11-14 PROCEDURE — 85027 COMPLETE CBC AUTOMATED: CPT

## 2018-11-14 PROCEDURE — 80048 BASIC METABOLIC PNL TOTAL CA: CPT

## 2018-11-14 PROCEDURE — 85730 THROMBOPLASTIN TIME PARTIAL: CPT

## 2018-11-17 NOTE — EKG
Test Reason : 

Blood Pressure : ***/*** mmHG

Vent. Rate : 090 BPM     Atrial Rate : 090 BPM

   P-R Int : 168 ms          QRS Dur : 076 ms

    QT Int : 360 ms       P-R-T Axes : 000 -21 022 degrees

   QTc Int : 440 ms

 

Normal sinus rhythm

Normal ECG

When compared with ECG of 27-JAN-1997 00:25,

No significant change was found

Confirmed by DR. MOJGAN PETERS ([id number]) on 11/17/2018 10:29:30 AM

 

Referred By:  JADIEL           Confirmed By:DR. MOJGAN PETERS

## 2018-11-21 ENCOUNTER — HOSPITAL ENCOUNTER (OUTPATIENT)
Dept: HOSPITAL 92 - LABBT | Age: 73
Discharge: HOME | End: 2018-11-21
Attending: UROLOGY
Payer: MEDICARE

## 2018-11-21 DIAGNOSIS — Z01.812: Primary | ICD-10-CM

## 2018-11-21 DIAGNOSIS — N20.2: ICD-10-CM

## 2018-11-21 PROCEDURE — 86901 BLOOD TYPING SEROLOGIC RH(D): CPT

## 2018-11-21 PROCEDURE — 86900 BLOOD TYPING SEROLOGIC ABO: CPT

## 2018-11-21 PROCEDURE — 86850 RBC ANTIBODY SCREEN: CPT

## 2018-11-28 ENCOUNTER — HOSPITAL ENCOUNTER (OUTPATIENT)
Dept: HOSPITAL 92 - SDC | Age: 73
Discharge: HOME | End: 2018-11-28
Attending: UROLOGY
Payer: MEDICARE

## 2018-11-28 VITALS — BODY MASS INDEX: 19.3 KG/M2

## 2018-11-28 DIAGNOSIS — K21.9: ICD-10-CM

## 2018-11-28 DIAGNOSIS — C67.5: ICD-10-CM

## 2018-11-28 DIAGNOSIS — C67.0: ICD-10-CM

## 2018-11-28 DIAGNOSIS — Z87.440: ICD-10-CM

## 2018-11-28 DIAGNOSIS — Z91.011: ICD-10-CM

## 2018-11-28 DIAGNOSIS — N20.2: Primary | ICD-10-CM

## 2018-11-28 DIAGNOSIS — D53.9: ICD-10-CM

## 2018-11-28 DIAGNOSIS — Z79.899: ICD-10-CM

## 2018-11-28 DIAGNOSIS — F41.9: ICD-10-CM

## 2018-11-28 DIAGNOSIS — Z88.8: ICD-10-CM

## 2018-11-28 DIAGNOSIS — Z91.018: ICD-10-CM

## 2018-11-28 PROCEDURE — C1758 CATHETER, URETERAL: HCPCS

## 2018-11-28 PROCEDURE — 0TC08ZZ EXTIRPATION OF MATTER FROM RIGHT KIDNEY, VIA NATURAL OR ARTIFICIAL OPENING ENDOSCOPIC: ICD-10-PCS | Performed by: UROLOGY

## 2018-11-28 PROCEDURE — 88300 SURGICAL PATH GROSS: CPT

## 2018-11-28 PROCEDURE — C1769 GUIDE WIRE: HCPCS

## 2018-11-28 PROCEDURE — 0T788DZ DILATION OF BILATERAL URETERS WITH INTRALUMINAL DEVICE, VIA NATURAL OR ARTIFICIAL OPENING ENDOSCOPIC: ICD-10-PCS | Performed by: UROLOGY

## 2018-11-28 PROCEDURE — 0TBB8ZX EXCISION OF BLADDER, VIA NATURAL OR ARTIFICIAL OPENING ENDOSCOPIC, DIAGNOSTIC: ICD-10-PCS | Performed by: UROLOGY

## 2018-11-28 PROCEDURE — 74420 UROGRAPHY RTRGR +-KUB: CPT

## 2018-11-28 PROCEDURE — 52234 CYSTOSCOPY AND TREATMENT: CPT

## 2018-11-28 PROCEDURE — 52356 CYSTO/URETERO W/LITHOTRIPSY: CPT

## 2018-11-28 PROCEDURE — 0TF78ZZ FRAGMENTATION IN LEFT URETER, VIA NATURAL OR ARTIFICIAL OPENING ENDOSCOPIC: ICD-10-PCS | Performed by: UROLOGY

## 2018-11-28 PROCEDURE — 88305 TISSUE EXAM BY PATHOLOGIST: CPT

## 2018-11-28 PROCEDURE — 82365 CALCULUS SPECTROSCOPY: CPT

## 2018-11-28 PROCEDURE — 74018 RADEX ABDOMEN 1 VIEW: CPT

## 2018-11-28 NOTE — RAD
KUB:

 

History: 

Pre op. Renal calculi. 

 

FINDINGS: 

Bilateral ureteral stents are in good position. Calcifications overlying the right kidney. There is a
lso a calcification which is potentially in the distal left ureter with some increased density seen o
verlying the distal end of the left ureteral stent. 

 

IMPRESSION: 

1. Multiple right sided renal calculi. 

2. Possible distal left ureteral calculus. 

3. Bilateral ureteral stents in good position. 

4. Suggestion of possibly some tiny faint calcifications over the left kidney.

 

POS: [facility]

## 2018-11-28 NOTE — RAD
RETROGRADE PYELOGRAM:

 

History: 

73-year-old female with history of bilateral retrogrades. 

 

FINDINGS: 

 film shows bilateral ureteral stents in place. There is injection and instrumentation of both u
reters. 

 

IMPRESSION: 

Bilateral ureteral stents. Bilateral instrumentation. 

 

POS: [facility]

## 2018-11-29 NOTE — OP
DATE OF PROCEDURE:  11/28/2018



PREOPERATIVE DIAGNOSES:  

1. 73-year-old female with history of Escherichia coli urosepsis.

2. History of left distal ureteral calculi, 5 to 6 mm with punctate left renal

lithiasis, right renal pelvic stone measuring 6 mm, punctate right renal 
calculi. 

3. 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 
the

trigone. 



POSTOPERATIVE DIAGNOSES:  

1. 73-year-old female with history of Escherichia coli urosepsis.

2. History of left distal ureteral calculi, 5 to 6 mm with punctate left renal

lithiasis, right renal pelvic stone measuring 6 mm, punctate right renal 
calculi. 

3. 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 
the

trigone. 



PROCEDURES PERFORMED:  Cystoscopy, bilateral stent exchange, bilateral 
retrograde

pyelogram, bilateral ureteroscopy, pyeloscopy, bilateral laser lithotripsy, 
basket

extraction of stone fragments, BL  6 x 26 double-J ureteral stent exchange,

transurethral resection of trigonal bladder tumor, resection of bladder neck 
lesion. 



ANESTHESIA:  General.



COMPLICATIONS:  None apparent.



DISPOSITION:  To recovery room in stable condition.



ESTIMATED BLOOD LOSS:  Minimal.



IV FLUIDS:  Approximately 600 mL.



SPECIMENS:  

1. Bilateral ureterorenal calculi.

2. TUR of trigonal bladder tumor, superficial and deep, sent separately.

3. Incidental bladder neck lesion, nonspecific.



INDICATIONS FOR PROCEDURE AND HISTORY:  Ms. Molina is a 73-year-old female, 
who

presented to [facility] Room due to severe urosepsis, E. coli, DIC,

thrombocytopenia. The patient underwent bilateral stent placement and had a

protracted stay in the ICU. She subsequently has recovered and was referred 
back to

me by her PCP. Previously, they desired treatment at [facility];

however, they  desire to continue their care with me. She presents today for

treatment of her stones and TURBT. On initial cystoscopy, I did not treat

her bladder tumor as it was incidental and as she presented with severe 
urosepsis.

FISH cytology initially was obtained positive. Indications for the procedure 
were

reviewed with the patient and family in detail including but not limited to:

Bleeding, pain, infection, urosepsis, PE, DVT, perioperative morbidity and

mortality, bladder, ureterorenal injury, possible secondary procedure was 
reviewed

with them in detail. Questions answered to their satisfactory and they decided 
to

proceed. Preoperative urine culture negative. 



DESCRIPTION OF PROCEDURE:  After an informed consent was signed, the patient was

taken to the operating room, placed in dorsal lithotomy position with bilateral 
TANK

hoses, SCDs, and broad spectrum antibiotics were provided. Genital area was 
prepped

and draped in the usual surgical sterile fashion. A 21-Trinidadian cystoscope was

utilized for cystoscope. Upon entering the bladder, I did appreciate the 
previous

bladder tumor in the trigone,  2 focuses. One

focus was in the right mid trigone, papillary, appeared superficial. Surface 
area

approximately 2 cm, second focus just proximal to this in the mid trigone, 1 cm.

Incidentally, on today's cystoscopy there was a smooth-walled bladder lesion,

appeared to be a papilloma-type of lesion; however, it was prominent at the 
bladder

neck 4-5 oclock location , approximately 8 to 10 mm; TUR of lesion done.  I  
treat her ureterorenal stones first.

Her previous left ureteral stent was removed and a 0.35 Sensor wire was placed 
into

the left upper pole. With the wire secured, I performed a rigid ureteroscopy in

which the stone was visualized in the distal intramural ureter. It appeared to 
be

dense in nature. Using 200 micron laser fiber at this setting, we laser

lithotripsy'd the stone into multiple fragments and was basket extracted with 
Zero

Tip Nitinol basket. Atraumatic retrieval was noted and upon further ureteroscopy

proximally, I did not see any further stone nidus. At this time, a 10-Trinidadian

dual-lumen access sheath was utilized over the wire and a retrograde pyelogram 
was

performed demonstrating some tortuosity at the proximal ureter. A second safety

wire, 0.35 super stiff was placed and an 11/13 x 28 cm navigator was placed 
without

difficulty, and we surveyed the collecting system. On CT, she appeared to have 
some

linear calcific density in the left mid to lower pole; therefore, I did survey 
the

collecting system; however, there was no stone amendable to be treated. We 
surveyed

the ureter distally with no evidence of stone nidus, no ureteral trauma. The

navigator and the ureteroscope were then removed and a 6 x 26 double-J ureteral

stent was placed in the left collecting system. The wire was then removed. We

performed the same procedure under right side, we cleared the right distal 
ureter

with a rigid ureteroscope with no evidence of ureteral calculi. The 0.35 Sensor 
wire

was placed into the right kidney via the existing ureteral stent. After the

ureteroscopy clearing the ureter, we did pass a second safety wire via a 10-
Trinidadian

dual-lumen access sheath after retrograde pyelogram to opacify the collecting

system. A 0.35 super stiff wire was then passed and dual-lumen access sheath was

removed. An 11/13 x 28 cm navigator was passed and flexible ureteroscope was

advanced over a guidewire assist. Surveying of the collecting system did 
demonstrate

that the renal pelvic UPJ stone had migrated into the mid upper pole, and we 
laser

lithotripsy'd this stone and basket extracted to complete. There was another 
nidus

adherent to the renal papilla, which was lasered free; this was approximately 3 
mm.

There was no stones left amendable to be further treated or basket extracted 
and the

ureter was rendered free of any stone or ureteral trauma. We left the wire in 
situ

as the stent in the contralateral site may impede visualization of the trigonal

bladder tumor. The wire was secured on the right side and we placed a 26-Trinidadian

resectoscope into the bladder without any resistance. The bladder neck lesion 
with a

smooth wall surface  it was protruding into the

bladder neck lumen, located at the level of bladder neck, not in the urethra. 
We resected

this and obtained good hemostasis and sent it as a separate specimen of bladder 
neck

lesion. At this time, we resected the bladder tumor in the mid trigone, 2 
focuses

were resected. The tumor appeared to be very superficial in nature and most of 
the

tumor obliterated with TUR using Gyrus bipolar. We did obtain a specimen of

superficial tumor and deep resection was performed and good hemostasis noted 
with

Gyrus. After adequate hemostasis and all specimens removed, we did place a 
right 6 x

26 double-J ureteral stent. A 20-Trinidadian 3-way Jenkins catheter was placed without

difficulty and attached to gravity bag. 30 mL was insufflated into the balloon 
and

CBI plugged. As she lives with her  and is mostly bedridden,  
desires

an indwelling gravity bag 24 x 7. She was discharged with Steele 5/325 #30 one 
p.o.

q.6 to 8 hours p.r.n., VESIcare 5 mg #14 ,, ciprofloxacin 500 mg 1

p.o. b.i.d. for 10 days, and Colace 100 mg 1 p.o. b.i.efraín mcallister. She will follow 
up

with me next Thursday for bilateral stent pull and to review pathology. 







Job ID:  [id number]



MTDD

## 2019-01-04 ENCOUNTER — HOSPITAL ENCOUNTER (OUTPATIENT)
Dept: HOSPITAL 92 - CT | Age: 74
Discharge: HOME | End: 2019-01-04
Attending: UROLOGY
Payer: MEDICARE

## 2019-01-04 DIAGNOSIS — Z01.818: Primary | ICD-10-CM

## 2019-01-04 DIAGNOSIS — F41.9: ICD-10-CM

## 2019-01-04 DIAGNOSIS — Z86.2: ICD-10-CM

## 2019-01-04 DIAGNOSIS — J84.10: ICD-10-CM

## 2019-01-04 DIAGNOSIS — N20.2: ICD-10-CM

## 2019-01-04 DIAGNOSIS — Z86.19: ICD-10-CM

## 2019-01-04 DIAGNOSIS — K44.9: ICD-10-CM

## 2019-01-04 DIAGNOSIS — R35.0: ICD-10-CM

## 2019-01-04 DIAGNOSIS — C67.9: ICD-10-CM

## 2019-01-04 LAB
ANION GAP SERPL CALC-SCNC: 13 MMOL/L (ref 10–20)
APTT PPP: 30.1 SEC (ref 22.9–36.1)
BUN SERPL-MCNC: 14 MG/DL (ref 9.8–20.1)
CALCIUM SERPL-MCNC: 8.9 MG/DL (ref 7.8–10.44)
CHLORIDE SERPL-SCNC: 106 MMOL/L (ref 98–107)
CO2 SERPL-SCNC: 24 MMOL/L (ref 23–31)
CREAT CL PREDICTED SERPL C-G-VRATE: 0 ML/MIN (ref 70–130)
ESTIMATED GFR-MDRD - POC: 55
GLUCOSE SERPL-MCNC: 97 MG/DL (ref 83–110)
HGB BLD-MCNC: 11.8 G/DL (ref 12–16)
INR PPP: 1
MCH RBC QN AUTO: 30.7 PG (ref 27–31)
MCV RBC AUTO: 93.9 FL (ref 78–98)
PLATELET # BLD AUTO: 203 THOU/UL (ref 130–400)
POTASSIUM SERPL-SCNC: 4 MMOL/L (ref 3.5–5.1)
PROTHROMBIN TIME: 13.3 SEC (ref 12–14.7)
RBC # BLD AUTO: 3.84 MILL/UL (ref 4.2–5.4)
SODIUM SERPL-SCNC: 139 MMOL/L (ref 136–145)
WBC # BLD AUTO: 7.2 THOU/UL (ref 4.8–10.8)

## 2019-01-04 PROCEDURE — 82565 ASSAY OF CREATININE: CPT

## 2019-01-04 PROCEDURE — 80048 BASIC METABOLIC PNL TOTAL CA: CPT

## 2019-01-04 PROCEDURE — 93010 ELECTROCARDIOGRAM REPORT: CPT

## 2019-01-04 PROCEDURE — 74178 CT ABD&PLV WO CNTR FLWD CNTR: CPT

## 2019-01-04 PROCEDURE — 87086 URINE CULTURE/COLONY COUNT: CPT

## 2019-01-04 PROCEDURE — 81001 URINALYSIS AUTO W/SCOPE: CPT

## 2019-01-04 PROCEDURE — 85027 COMPLETE CBC AUTOMATED: CPT

## 2019-01-04 PROCEDURE — 71260 CT THORAX DX C+: CPT

## 2019-01-04 PROCEDURE — 93005 ELECTROCARDIOGRAM TRACING: CPT

## 2019-01-04 PROCEDURE — 85610 PROTHROMBIN TIME: CPT

## 2019-01-04 PROCEDURE — 85730 THROMBOPLASTIN TIME PARTIAL: CPT

## 2019-01-04 NOTE — CT
CHEST CT SCAN WITH IV CONTRAST

ABDOMEN AND PELVIC CT SCAN WITH AND WITHOUT IV CONTRAST:

 

FINDINGS: 

 

CHEST CT SCAN WITH IV CONTRAST:

There is a calcified granuloma in the region of the left lower lobe.  A small hiatal hernia.  Minimal
 dilatation of the ascending aorta up to 4.2 cm.  No evidence for focal aortic aneurysm.  No mediasti
nal mass or adenopathy.  Minimal pericardial fluid.  Heterogeneous bone demineralization.  Several th
oracic vertebral bodies which show vertical height loss, evidence for some compression.  

 

IMPRESSION: 

Calcified granuloma in the left lung.  No evidence of metastasis.  Small hiatal hernia.  No mediastin
al mass or adenopathy.

 

 

ABDOMEN AND PELVIC CT SCAN WITH AND WITHOUT IV CONTRAST:

The liver appears unremarkable.  A normal gallbladder is not seen.  No significant intrahepatic ducta
l dilatation.  There are some fatty changes throughout the pancreas but no focal pancreatic mass.  Th
e spleen and adrenal glands are unremarkable.  There appear to be some very tiny faint bilateral nono
bstructing renal calculi.  No evidence for acute  obstruction.  Normal-appearing urinary bladder.  
Urinary bladder appears unremarkable.  The previously noted more numerous and larger bilateral  trev
culi are no longer evident.  Healed pelvic fractures.  Several lumbar vertebral bodies show vertical 
height loss with associated mild compression fractures with significant bone demineralization.  

 

Several very tiny very faint bilateral nonobstructing renal calculi.  No evidence for  obstruction.
  Normal-appearing bladder.  Other findings as above.

 

POS: [facility]

## 2019-01-07 ENCOUNTER — HOSPITAL ENCOUNTER (OUTPATIENT)
Dept: HOSPITAL 92 - NM | Age: 74
Discharge: HOME | End: 2019-01-07
Attending: UROLOGY
Payer: MEDICARE

## 2019-01-07 DIAGNOSIS — C67.9: Primary | ICD-10-CM

## 2019-01-07 DIAGNOSIS — Z87.442: ICD-10-CM

## 2019-01-07 PROCEDURE — 78306 BONE IMAGING WHOLE BODY: CPT

## 2019-01-07 PROCEDURE — A9503 TC99M MEDRONATE: HCPCS

## 2019-01-07 NOTE — NM
WHOLE BODY BONE SCAN:

 

Date:  01/07/19 

 

COMPARISON:  

CT chest and abdomen and pelvis dated 01/04/19. 

 

HISTORY:  

Bladder cancer. 

 

TECHNIQUE:  

A whole body bone scan was performed after administration of 29.2 mCi technetium-99m MDP. 

 

FINDINGS:

There are multiple areas of abnormal uptake of the radiopharmaceutical in the spine consistent with t
he degenerative changes seen on CT. There is uptake in the sacrum which may represent sacral insuffic
iency fracture. Abnormal uptake is seen in the right aspect of the pelvis consistent with healing rig
ht superior and inferior pubic rami fractures. 

 

There is uptake in two anterior ribs. There are sclerotic regions in these ribs on the chest CT. Thes
e sclerotic lesions are seen in the right fourth and the left third ribs. These are nonspecific and c
ould represent remote healed fractures/healing fractures, or could potentially represent osseous meta
stasis. 

 

IMPRESSION: 

Inconclusive examination with potential metastatic disease to anterior ribs. Please note these abnorm
al areas of uptake in the ribs could represent healing rib fractures. 

 

 

POS: [facility]

## 2019-01-08 NOTE — CT
CHEST CT SCAN WITH IV CONTRAST

ABDOMEN AND PELVIC CT SCAN WITH AND WITHOUT IV CONTRAST:

 

FINDINGS: 

 

CHEST CT SCAN WITH IV CONTRAST:

There is a calcified granuloma in the region of the left lower lobe.  A small hiatal hernia.  Minimal
 dilatation of the ascending aorta up to 4.2 cm.  No evidence for focal aortic aneurysm.  No mediasti
nal mass or adenopathy.  Minimal pericardial fluid.  Heterogeneous bone demineralization.  Several th
oracic vertebral bodies which show vertical height loss, evidence for some compression.  

 

IMPRESSION: 

Calcified granuloma in the left lung.  No evidence of metastasis.  Small hiatal hernia.  No mediastin
al mass or adenopathy.

 

 

ABDOMEN AND PELVIC CT SCAN WITH AND WITHOUT IV CONTRAST:

The liver appears unremarkable.  A normal gallbladder is not seen.  No significant intrahepatic ducta
l dilatation.  There are some fatty changes throughout the pancreas but no focal pancreatic mass.  Th
e spleen and adrenal glands are unremarkable.  There appear to be some very tiny faint bilateral nono
bstructing renal calculi.  No evidence for acute  obstruction.  Normal-appearing urinary bladder.  
Urinary bladder appears unremarkable.  The previously noted more numerous and larger bilateral  trev
culi are no longer evident.  Healed pelvic fractures.  Several lumbar vertebral bodies show vertical 
height loss with associated mild compression fractures with significant bone demineralization.  

 

Several very tiny very faint bilateral nonobstructing renal calculi.  No evidence for  obstruction.
  Normal-appearing bladder.  Other findings as above.

## 2019-01-14 ENCOUNTER — HOSPITAL ENCOUNTER (OUTPATIENT)
Dept: HOSPITAL 92 - LABBT | Age: 74
Discharge: HOME | End: 2019-01-14
Attending: UROLOGY
Payer: MEDICARE

## 2019-01-14 DIAGNOSIS — R35.0: ICD-10-CM

## 2019-01-14 DIAGNOSIS — F41.9: ICD-10-CM

## 2019-01-14 DIAGNOSIS — C67.9: ICD-10-CM

## 2019-01-14 DIAGNOSIS — Z87.442: ICD-10-CM

## 2019-01-14 DIAGNOSIS — Z01.812: Primary | ICD-10-CM

## 2019-01-14 DIAGNOSIS — N20.2: ICD-10-CM

## 2019-01-14 DIAGNOSIS — Z86.2: ICD-10-CM

## 2019-01-14 DIAGNOSIS — Z86.19: ICD-10-CM

## 2019-01-14 PROCEDURE — 86850 RBC ANTIBODY SCREEN: CPT

## 2019-01-14 PROCEDURE — 86900 BLOOD TYPING SEROLOGIC ABO: CPT

## 2019-01-14 PROCEDURE — 86901 BLOOD TYPING SEROLOGIC RH(D): CPT

## 2019-01-16 ENCOUNTER — HOSPITAL ENCOUNTER (OUTPATIENT)
Dept: HOSPITAL 92 - SDC | Age: 74
Discharge: HOME | End: 2019-01-16
Attending: UROLOGY
Payer: MEDICARE

## 2019-01-16 VITALS — BODY MASS INDEX: 19 KG/M2

## 2019-01-16 DIAGNOSIS — Z85.51: ICD-10-CM

## 2019-01-16 DIAGNOSIS — N30.00: Primary | ICD-10-CM

## 2019-01-16 DIAGNOSIS — Z79.899: ICD-10-CM

## 2019-01-16 DIAGNOSIS — Z88.8: ICD-10-CM

## 2019-01-16 DIAGNOSIS — Z98.890: ICD-10-CM

## 2019-01-16 DIAGNOSIS — N30.20: ICD-10-CM

## 2019-01-16 DIAGNOSIS — Z91.011: ICD-10-CM

## 2019-01-16 DIAGNOSIS — Z91.018: ICD-10-CM

## 2019-01-16 PROCEDURE — C1769 GUIDE WIRE: HCPCS

## 2019-01-16 PROCEDURE — 88307 TISSUE EXAM BY PATHOLOGIST: CPT

## 2019-01-16 PROCEDURE — 52204 CYSTOSCOPY W/BIOPSY(S): CPT

## 2019-01-16 PROCEDURE — 96374 THER/PROPH/DIAG INJ IV PUSH: CPT

## 2019-01-16 PROCEDURE — 0TBB8ZX EXCISION OF BLADDER, VIA NATURAL OR ARTIFICIAL OPENING ENDOSCOPIC, DIAGNOSTIC: ICD-10-PCS | Performed by: UROLOGY

## 2019-01-16 PROCEDURE — C1758 CATHETER, URETERAL: HCPCS

## 2019-01-16 NOTE — OP
DATE OF PROCEDURE:  01/16/2019



PREOPERATIVE DIAGNOSES:  

1. A 73-year-old female with history of high-grade pathologic T1 TCC of trigone,

bladder neck, lamina propria invasion. 

2. History of bilateral ureteral calculi with sepsis, status post ureteroscopy 
with

resolution. 



POSTOPERATIVE DIAGNOSES:  

1. A 73-year-old female with history of high-grade pathologic T1 TCC of trigone,

bladder neck, lamina propria invasion. 

2. History of bilateral ureteral calculi with sepsis, status post ureteroscopy 
with

resolution. 



PROCEDURES PERFORMED:  Cystoscopy, transurethral resection of bladder tumor

restaging. 



ANESTHESIA:  General.



COMPLICATIONS:  None apparent.



DISPOSITION:  To recovery room in stable condition.



SPECIMEN:  Left trigonal bladder tumor region:

1. Superficial.

2. Deep.



DRAINS:  Eighteen-Citizen of [place] 30 mL three way Jenkins catheter to gravity CBI port 
plugged.



INTRAOPERATIVE FINDINGS:  No evidence of tumor recurrence, there is previous

resection site of the trigone, bladder neck with minor granulomatous reaction. 



INDICATIONS FOR PROCEDURE AND HISTORY:  Ms. Molina is a 73-year-old female 
whom I

seen as an inpatient consultation back in 10/2018.  She presented with DIC, 
severe

sepsis, coagulopathy secondary to E coli urosepsis.  She subsequently underwent

cysto stent placement, however, upon cystoscopy of her ureteral stent placement
, she

was found to have an incidental bladder mass which subsequently was treated at a

later date as she presented with severe sepsis.  Repeat cystoscopy and 
transurethral

resection of the bladder tumor was performed after she convalesced on 11/08/2018

demonstrating high-grade pathologic T1 lamina propria invasion, TCC of the 
trigone

and bladder neck.  Due to lamina propria invasion, restaging TURBT was advised.

Risks and complications and indications were reviewed with patient and family in

detail.  Risks and complications including, but not limited to:  Bleeding, pain,

infection, injury to adjacent organs, urosepsis, stricture formation, bladder

perforation was reviewed.  They desired to proceed. 



DESCRIPTION OF PROCEDURE:  After an informed consent was signed, the patient 
taken

to the operating room, placed in a dorsal lithotomy position with the genital 
area

prepped and draped in the usual surgical sterile fashion.  Bilateral TANK hose 
SCDs

and broad-spectrum antibiotics were provided.  A 21-Citizen of [place] cystoscope was 
utilized

for cystoscopy.  Upon entering the bladder, her previous resection site of the

trigone and the bladder neck was well healed.  There was tiny amount of 
intermittent

granulomatous reaction with the previous resection site, however, no evidence of

tumor recurrence was appreciated.  Bimanual exam was performed concomitantly

demonstrating no indurating mass in the trigone or the bladder neck, urethral

region.  The bladder was surveyed with a 30 and 70-degree lens, which 
demonstrated

no evidence of other nidus of tumor.  Using a 26-Citizen of [place] resectoscope with a 
visual

obturator, this passed.  Using a bipolar gyrus bladder loop, we took specimen 
from

the previous resection site of the trigone and bladder neck, superficial and 
deep.

The bed of the resection site was then cauterized with gyrus.  Good hemostasis 
was

noted.  An 18-Citizen of [place] 30 mL three way Jenkins catheter placed with CBI port 
plugged.

Clear output was noted.  She will follow up in my clinic next week to have 
catheter

removal and review pathology.  I will discuss with the patient and family 
regarding

options of BCG versus observation.  Concern regarding her frailty and advanced 
age

and tolerability of BCG.  We will discuss further with family.  She is 
discharged

with oxybutynin 10 mg XL one p.o. daily #30, Colace 100 mg one p.o. b.i.d., 
Omnicef

300 mg one p.o. b.i.d. x7 days, until catheter removal. 







Job ID:  [id number]



[facility]

## 2022-01-13 ENCOUNTER — HOSPITAL ENCOUNTER (OUTPATIENT)
Dept: HOSPITAL 92 - CSHCT | Age: 77
Discharge: HOME | End: 2022-01-13
Attending: UROLOGY
Payer: MEDICARE

## 2022-01-13 DIAGNOSIS — N20.0: ICD-10-CM

## 2022-01-13 DIAGNOSIS — C67.9: Primary | ICD-10-CM

## 2022-01-13 PROCEDURE — 74177 CT ABD & PELVIS W/CONTRAST: CPT

## 2022-01-19 NOTE — PDOC.PN
- Subjective


Encounter Start Date: 10/14/18


Encounter Start Time: 12:00


-: non-verbal





Patient seen and examined for Sepsis - on NIPPV. Encephalopathic. No overnight 

events





- Objective


Resuscitation Status: 


 











Resuscitation Status           FULL:Full Resuscitation














MAR Reviewed: Yes


Vital Signs & Weight: 


 Vital Signs (12 hours)











  Temp Pulse Resp BP Pulse Ox


 


 10/14/18 20:00  98.9 F   26 H  


 


 10/14/18 18:52   83  34 H   98


 


 10/14/18 17:08   91   112/70 


 


 10/14/18 17:00    30 H  


 


 10/14/18 16:45      100


 


 10/14/18 16:00  98.2 F    


 


 10/14/18 13:11   94   


 


 10/14/18 13:10   94  36 H   93 L


 


 10/14/18 12:00  98.2 F    








 Weight











Admit Weight                   134 lb 8 oz


 


Weight                         138 lb 3.677 oz











 Most Recent Monitor Data











Heart Rate from ECG            85


 


NIBP                           92/60


 


NIBP BP-Mean                   77


 


Respiration from ECG           25


 


SpO2                           100














I&O: 


 











 10/13/18 10/14/18 10/15/18





 06:59 06:59 06:59


 


Intake Total 2275.3 1730 1060


 


Output Total 2165 3400 2490


 


Balance 110.3 -1670 -1430











Result Diagrams: 


 10/14/18 03:42





 10/14/18 03:42


Additional Labs: 


 Accuchecks











  10/14/18 10/14/18 10/13/18





  18:41 14:18 23:24


 


POC Glucose  103  93  104











EKG Reviewed by me: Yes (Tele SR)





Phys Exam





- Physical Examination


on NIPPV


Respiratory: no wheezing


B/L rhonchi with scat rales


Cardiovascular: RRR, no rub


Gastrointestinal: soft, positive bowel sounds


Musculoskeletal: no edema





Dx/Plan





- Plan


DVT proph w/SCDs





IMPRESSION:


1.  Severe sepsis with acute organ dysfunction/septic shock secondary to 

complicated urinary tract infection.  s/p ureteral stent placement


2.  Acute hypoxic respiratory failure - extubated 10/12


3.  Acute kidney injury on chronic kidney disease stage 2 secondary to #1.


4.  Metabolic acidosis/lactic acidosis.  


5.  DIC/Thrombocytopenia with coagulopathy, probably secondary to sepsis.  


6.  Electrolyte abnormality - hypokalemia, hypophosphatemia, and hypomagnesemia.


7.  Abnormal liver function tests.


8.  External Hemorrhoid/New Bladder cancer


 


PLAN:


Cont Atbx/NIPPV


Cont current IVF


Cont other meds as below


AM labs














Review of Systems





- Review of Systems


Other: 





Cannot obtain due to current mentation





- Medications/Allergies


Allergies/Adverse Reactions: 


 Allergies











Allergy/AdvReac Type Severity Reaction Status Date / Time


 


No Known Drug Allergies Allergy   Verified 08/27/18 15:57











Medications: 


 Current Medications





Acetaminophen (Tylenol Elixir)  650 mg PO Q6H PRN


   PRN Reason: Fever > 101 or Mild Pain


Acetaminophen (Tylenol)  650 mg KS Q6H PRN


   PRN Reason: Fever > 101 or Mild Pain


   Last Admin: 10/13/18 02:21 Dose:  650 mg


Albuterol/Ipratropium (Duoneb)  3 ml NEB [id number] PRN


   PRN Reason: SOB &/or Wheezing


Albuterol/Ipratropium (Duoneb)  3 ml NEB [id number] ALEJANDRO


   Last Admin: 10/14/18 18:52 Dose:  3 ml


Bisacodyl (Dulcolax)  10 mg KS DAILYPRN PRN


   PRN Reason: Constipation


Famotidine (Pepcid)  20 mg SLOW IVP Q24HR ALEJANDRO


   Last Admin: 10/14/18 20:48 Dose:  20 mg


Furosemide (Lasix)  40 mg SLOW IVP DAILY ALEJANDRO


   Last Admin: 10/14/18 08:32 Dose:  40 mg


Norepinephrine Bitartrate (Levophed)  250 mls @ 0 mls/hr IVPB PRN PRN; Protocol


   PRN Reason: To maintain MAP > 65


   Last Admin: 10/13/18 04:12 Dose:  250 mls


Dextrose/Water (D5w)  1,000 mls @ 50 mls/hr IV .Q20H ALEJANDRO


   Last Admin: 10/14/18 18:36 Dose:  1,000 mls


Fentanyl Citrate 2,000 mcg/ (Sodium Chloride)  100 mls @ 0 mls/hr IV INF ALEJANDRO; 

Protocol


   Stop: 11/13/18 16:48


Fentanyl Citrate (Fentanyl Bolus)  250 mls @ 0 mls/hr IVPB PRN PRN


   PRN Reason: Breakthrough pain/agitation


   Stop: 11/13/18 16:48


Vancomycin HCl 1.25 gm/ Sodium (Chloride)  250 mls @ 166.667 mls/hr IVPB Q24HR@

1700 ALEJANDRO


   Last Admin: 10/14/18 17:26 Dose:  250 mls


Meropenem 1 gm/ Device  50 mls @ 100 mls/hr IVPB Q8HR ALEJANDRO


   Last Admin: 10/14/18 21:14 Dose:  50 mls


Lorazepam (Ativan)  2 mg SLOW IVP Q1H PRN


   PRN Reason: Breakthrough agitation


   Stop: 11/13/18 16:48


Methylprednisolone Sodium Succinate (Solu-Medrol)  40 mg IVP Q6HR ALEJANDRO


   Stop: 10/15/18 06:01


   Last Admin: 10/14/18 18:36 Dose:  40 mg


Mineral Oil/White Petrolatum (Eucerin Cream)  0 gm TOP BIDPRN PRN


   PRN Reason: Dry Skin


Mineral Oil/White Petrolatum (Lacri-Lube Ointment)  0 gm EA EYE PRN PRN


   PRN Reason: Dry Eyes


   Last Admin: 10/14/18 17:08 Dose:  1 applic


Miscellaneous Medication (Pharmacy To Dose)  1 each IVPB PRN PRN


   PRN Reason: Pharmacy to dose


Miscellaneous Medication (Pharmacy To Dose)  1 each IVPB PRN PRN


   PRN Reason: Pharmacy to dose


Morphine Sulfate (Morphine Sulfate)  2 mg SLOW IVP Q1H PRN


   PRN Reason: BREAKTHROUGH PAIN/AGITATION


   Stop: 11/13/18 16:48


Morphine Sulfate (Morphine)  2 mg SLOW IVP Q1H PRN


   PRN Reason: BREAKTHROUGH PAIN/AGITATION


   Stop: 11/13/18 16:55


Discontinue Previous Narcotic Pain Medications And Benzodiazepines  1 each FS 

.ONE [facility]


   Stop: 11/13/18 16:48


Ondansetron HCl (Zofran)  4 mg IVP Q6H PRN


   PRN Reason: Nausea/Vomiting


   Last Admin: 10/09/18 22:54 Dose:  4 mg


Ondansetron HCl (Zofran)  4 mg IVP Q4H PRN


   PRN Reason: Nausea/Vomiting


Propofol (Diprivan)  1,000 mg IV INF PRN; Protocol


   PRN Reason: TO ACHIEVE GOAL RASS


   Stop: 11/13/18 16:48


   Last Admin: 10/14/18 17:00 Dose:  1,000 mg


Propofol (Diprivan Bolus)  20 mg IV Q5MIN PRN


   PRN Reason: BREAKTHROUGH AGITATION


   Stop: 11/13/18 16:48


Sodium Chloride (Flush - Normal Saline)  10 ml IVF PRN PRN


   PRN Reason: Saline Flush Detail Level: Simple

## 2022-02-27 ENCOUNTER — HOSPITAL ENCOUNTER (EMERGENCY)
Dept: HOSPITAL 57 - BURERS | Age: 77
Discharge: HOME | End: 2022-02-27
Payer: MEDICARE

## 2022-02-27 DIAGNOSIS — D50.9: ICD-10-CM

## 2022-02-27 DIAGNOSIS — K21.9: ICD-10-CM

## 2022-02-27 DIAGNOSIS — K56.41: Primary | ICD-10-CM

## 2022-02-27 PROCEDURE — 99282 EMERGENCY DEPT VISIT SF MDM: CPT

## 2023-01-09 ENCOUNTER — HOSPITAL ENCOUNTER (OUTPATIENT)
Dept: HOSPITAL 92 - BICCT | Age: 78
Discharge: HOME | End: 2023-01-09
Attending: UROLOGY
Payer: MEDICARE

## 2023-01-09 DIAGNOSIS — K44.9: ICD-10-CM

## 2023-01-09 DIAGNOSIS — C67.9: Primary | ICD-10-CM

## 2023-01-09 DIAGNOSIS — N20.2: ICD-10-CM

## 2023-01-09 LAB — EGFRCR SERPLBLD CKD-EPI 2021: 47 ML/MIN/{1.73_M2}

## 2023-01-09 PROCEDURE — 82565 ASSAY OF CREATININE: CPT

## 2023-01-09 PROCEDURE — 74178 CT ABD&PLV WO CNTR FLWD CNTR: CPT

## 2023-04-12 ENCOUNTER — HOSPITAL ENCOUNTER (OUTPATIENT)
Dept: HOSPITAL 92 - ULT | Age: 78
Discharge: HOME | End: 2023-04-12
Attending: UROLOGY
Payer: MEDICARE

## 2023-04-12 DIAGNOSIS — N13.30: ICD-10-CM

## 2023-04-12 DIAGNOSIS — N20.0: Primary | ICD-10-CM

## 2023-04-12 PROCEDURE — 76770 US EXAM ABDO BACK WALL COMP: CPT

## 2023-04-13 ENCOUNTER — HOSPITAL ENCOUNTER (OUTPATIENT)
Dept: HOSPITAL 92 - CT | Age: 78
Discharge: HOME | End: 2023-04-13
Attending: UROLOGY
Payer: MEDICARE

## 2023-04-13 ENCOUNTER — HOSPITAL ENCOUNTER (OUTPATIENT)
Dept: HOSPITAL 92 - LABBT | Age: 78
Discharge: HOME | End: 2023-04-13
Attending: UROLOGY
Payer: MEDICARE

## 2023-04-13 DIAGNOSIS — S22.42XD: ICD-10-CM

## 2023-04-13 DIAGNOSIS — N13.30: Primary | ICD-10-CM

## 2023-04-13 DIAGNOSIS — K44.9: ICD-10-CM

## 2023-04-13 DIAGNOSIS — N20.0: ICD-10-CM

## 2023-04-13 DIAGNOSIS — Z01.818: Primary | ICD-10-CM

## 2023-04-13 DIAGNOSIS — N13.30: ICD-10-CM

## 2023-04-13 LAB
ANION GAP SERPL CALC-SCNC: 14 MMOL/L (ref 10–20)
BUN SERPL-MCNC: 18 MG/DL (ref 9.8–20.1)
CALCIUM SERPL-MCNC: 9.2 MG/DL (ref 7.8–10.44)
CHLORIDE SERPL-SCNC: 108 MMOL/L (ref 98–107)
CO2 SERPL-SCNC: 23 MMOL/L (ref 23–31)
CREAT CL PREDICTED SERPL C-G-VRATE: 0 ML/MIN (ref 70–130)
GLUCOSE SERPL-MCNC: 87 MG/DL (ref 83–110)
HGB BLD-MCNC: 11.9 G/DL (ref 12–15.5)
MCH RBC QN AUTO: 29.4 PG (ref 27–33)
MCV RBC AUTO: 92.6 FL (ref 81.6–98.3)
PLATELET # BLD AUTO: 201 10X3/UL (ref 150–450)
POTASSIUM SERPL-SCNC: 3.9 MMOL/L (ref 3.5–5.1)
RBC # BLD AUTO: 4.05 10X6/UL (ref 3.9–5.03)
SODIUM SERPL-SCNC: 141 MMOL/L (ref 136–145)
WBC # BLD AUTO: 5.4 10X3/UL (ref 3.5–10.5)

## 2023-04-13 PROCEDURE — 81001 URINALYSIS AUTO W/SCOPE: CPT

## 2023-04-13 PROCEDURE — 74176 CT ABD & PELVIS W/O CONTRAST: CPT

## 2023-04-13 PROCEDURE — 93005 ELECTROCARDIOGRAM TRACING: CPT

## 2023-04-13 PROCEDURE — 85027 COMPLETE CBC AUTOMATED: CPT

## 2023-04-13 PROCEDURE — 86901 BLOOD TYPING SEROLOGIC RH(D): CPT

## 2023-04-13 PROCEDURE — 87086 URINE CULTURE/COLONY COUNT: CPT

## 2023-04-13 PROCEDURE — 87077 CULTURE AEROBIC IDENTIFY: CPT

## 2023-04-13 PROCEDURE — 87186 SC STD MICRODIL/AGAR DIL: CPT

## 2023-04-13 PROCEDURE — 80048 BASIC METABOLIC PNL TOTAL CA: CPT

## 2023-04-13 PROCEDURE — 86850 RBC ANTIBODY SCREEN: CPT

## 2023-04-13 PROCEDURE — 86900 BLOOD TYPING SEROLOGIC ABO: CPT

## 2023-04-13 PROCEDURE — 93010 ELECTROCARDIOGRAM REPORT: CPT

## 2023-04-17 ENCOUNTER — HOSPITAL ENCOUNTER (OUTPATIENT)
Dept: HOSPITAL 92 - SDC | Age: 78
Discharge: HOME | End: 2023-04-17
Attending: UROLOGY
Payer: MEDICARE

## 2023-04-17 VITALS — BODY MASS INDEX: 21.9 KG/M2

## 2023-04-17 DIAGNOSIS — N13.2: Primary | ICD-10-CM

## 2023-04-17 DIAGNOSIS — K21.9: ICD-10-CM

## 2023-04-17 DIAGNOSIS — Z91.018: ICD-10-CM

## 2023-04-17 DIAGNOSIS — Z88.8: ICD-10-CM

## 2023-04-17 DIAGNOSIS — Z85.51: ICD-10-CM

## 2023-04-17 DIAGNOSIS — Z91.011: ICD-10-CM

## 2023-04-17 DIAGNOSIS — Z79.899: ICD-10-CM

## 2023-04-17 DIAGNOSIS — Z79.2: ICD-10-CM

## 2023-04-17 LAB
APTT PPP: 29.8 SEC (ref 22.9–36.1)
BACTERIA UR QL AUTO: (no result) HPF
INR PPP: 1
LEUKOCYTE ESTERASE UR QL STRIP.AUTO: 75 LEU/UL
PROTHROMBIN TIME: 13.6 SEC (ref 12–14.7)
RBC UR QL AUTO: (no result) HPF (ref 0–3)
SP GR UR STRIP: 1.02 (ref 1–1.04)
WBC UR QL AUTO: (no result) HPF (ref 0–3)

## 2023-04-17 PROCEDURE — 74420 UROGRAPHY RTRGR +-KUB: CPT

## 2023-04-17 PROCEDURE — 81001 URINALYSIS AUTO W/SCOPE: CPT

## 2023-04-17 PROCEDURE — 74018 RADEX ABDOMEN 1 VIEW: CPT

## 2023-04-17 PROCEDURE — 86901 BLOOD TYPING SEROLOGIC RH(D): CPT

## 2023-04-17 PROCEDURE — 86850 RBC ANTIBODY SCREEN: CPT

## 2023-04-17 PROCEDURE — 85610 PROTHROMBIN TIME: CPT

## 2023-04-17 PROCEDURE — 85730 THROMBOPLASTIN TIME PARTIAL: CPT

## 2023-04-17 PROCEDURE — 87086 URINE CULTURE/COLONY COUNT: CPT

## 2023-04-17 PROCEDURE — 0T778DZ DILATION OF LEFT URETER WITH INTRALUMINAL DEVICE, VIA NATURAL OR ARTIFICIAL OPENING ENDOSCOPIC: ICD-10-PCS | Performed by: UROLOGY

## 2023-04-17 PROCEDURE — 52332 CYSTOSCOPY AND TREATMENT: CPT

## 2023-04-17 PROCEDURE — C2617 STENT, NON-COR, TEM W/O DEL: HCPCS

## 2023-04-17 PROCEDURE — 86900 BLOOD TYPING SEROLOGIC ABO: CPT

## 2023-04-17 PROCEDURE — 88121 CYTP URINE 3-5 PROBES CMPTR: CPT

## 2023-04-20 ENCOUNTER — HOSPITAL ENCOUNTER (OUTPATIENT)
Dept: HOSPITAL 92 - LABBT | Age: 78
Discharge: HOME | End: 2023-04-20
Attending: UROLOGY
Payer: MEDICARE

## 2023-04-20 DIAGNOSIS — Z01.812: Primary | ICD-10-CM

## 2023-04-20 DIAGNOSIS — N20.2: ICD-10-CM

## 2023-04-20 DIAGNOSIS — R35.0: ICD-10-CM

## 2023-04-20 DIAGNOSIS — C67.9: ICD-10-CM

## 2023-04-20 LAB
ANION GAP SERPL CALC-SCNC: 18 MMOL/L (ref 10–20)
APTT PPP: 28.4 SEC (ref 22–33)
BUN SERPL-MCNC: 12 MG/DL (ref 9.8–20.1)
CALCIUM SERPL-MCNC: 8.8 MG/DL (ref 7.8–10.44)
CHLORIDE SERPL-SCNC: 106 MMOL/L (ref 98–107)
CO2 SERPL-SCNC: 20 MMOL/L (ref 23–31)
CREAT CL PREDICTED SERPL C-G-VRATE: 0 ML/MIN (ref 70–130)
GLUCOSE SERPL-MCNC: 100 MG/DL (ref 83–110)
HGB BLD-MCNC: 12.2 G/DL (ref 12–15.5)
INR PPP: 0.9
MCH RBC QN AUTO: 29.5 PG (ref 27–33)
MCV RBC AUTO: 92.5 FL (ref 81.6–98.3)
PLATELET # BLD AUTO: 196 10X3/UL (ref 150–450)
POTASSIUM SERPL-SCNC: 4.2 MMOL/L (ref 3.5–5.1)
PROTHROMBIN TIME: 9.9 SEC (ref 9.5–12.1)
RBC # BLD AUTO: 4.13 10X6/UL (ref 3.9–5.03)
SODIUM SERPL-SCNC: 140 MMOL/L (ref 136–145)
WBC # BLD AUTO: 7.5 10X3/UL (ref 3.5–10.5)

## 2023-04-20 PROCEDURE — 85610 PROTHROMBIN TIME: CPT

## 2023-04-20 PROCEDURE — 80048 BASIC METABOLIC PNL TOTAL CA: CPT

## 2023-04-20 PROCEDURE — 85730 THROMBOPLASTIN TIME PARTIAL: CPT

## 2023-04-20 PROCEDURE — 85027 COMPLETE CBC AUTOMATED: CPT

## 2023-04-26 ENCOUNTER — HOSPITAL ENCOUNTER (OUTPATIENT)
Dept: HOSPITAL 92 - SDC | Age: 78
Discharge: HOME | End: 2023-04-26
Attending: UROLOGY
Payer: MEDICARE

## 2023-04-26 VITALS — BODY MASS INDEX: 21.9 KG/M2

## 2023-04-26 DIAGNOSIS — Z79.899: ICD-10-CM

## 2023-04-26 DIAGNOSIS — Z88.8: ICD-10-CM

## 2023-04-26 DIAGNOSIS — Z79.2: ICD-10-CM

## 2023-04-26 DIAGNOSIS — N20.2: Primary | ICD-10-CM

## 2023-04-26 DIAGNOSIS — Z85.51: ICD-10-CM

## 2023-04-26 DIAGNOSIS — Z91.018: ICD-10-CM

## 2023-04-26 DIAGNOSIS — K21.9: ICD-10-CM

## 2023-04-26 PROCEDURE — 0TC78ZZ EXTIRPATION OF MATTER FROM LEFT URETER, VIA NATURAL OR ARTIFICIAL OPENING ENDOSCOPIC: ICD-10-PCS | Performed by: UROLOGY

## 2023-04-26 PROCEDURE — 82365 CALCULUS SPECTROSCOPY: CPT

## 2023-04-26 PROCEDURE — C1769 GUIDE WIRE: HCPCS

## 2023-04-26 PROCEDURE — C2617 STENT, NON-COR, TEM W/O DEL: HCPCS

## 2023-04-26 PROCEDURE — 88300 SURGICAL PATH GROSS: CPT

## 2023-04-26 PROCEDURE — 74420 UROGRAPHY RTRGR +-KUB: CPT

## 2023-04-26 PROCEDURE — 0T778DZ DILATION OF LEFT URETER WITH INTRALUMINAL DEVICE, VIA NATURAL OR ARTIFICIAL OPENING ENDOSCOPIC: ICD-10-PCS | Performed by: UROLOGY

## 2023-04-26 PROCEDURE — 74018 RADEX ABDOMEN 1 VIEW: CPT

## 2023-04-26 PROCEDURE — C1747: HCPCS

## 2023-10-25 ENCOUNTER — HOSPITAL ENCOUNTER (OUTPATIENT)
Dept: HOSPITAL 92 - BICULT | Age: 78
Discharge: HOME | End: 2023-10-25
Attending: UROLOGY
Payer: MEDICARE

## 2023-10-25 DIAGNOSIS — N20.0: ICD-10-CM

## 2023-10-25 DIAGNOSIS — R35.0: ICD-10-CM

## 2023-10-25 DIAGNOSIS — Z45.82: ICD-10-CM

## 2023-10-25 DIAGNOSIS — C67.9: Primary | ICD-10-CM

## 2023-10-25 PROCEDURE — 76770 US EXAM ABDO BACK WALL COMP: CPT

## 2023-10-25 PROCEDURE — 74018 RADEX ABDOMEN 1 VIEW: CPT
